# Patient Record
Sex: MALE | Race: OTHER | URBAN - METROPOLITAN AREA
[De-identification: names, ages, dates, MRNs, and addresses within clinical notes are randomized per-mention and may not be internally consistent; named-entity substitution may affect disease eponyms.]

---

## 2018-05-24 ENCOUNTER — HOSPITAL ENCOUNTER (EMERGENCY)
Facility: HOSPITAL | Age: 53
Discharge: HOME | End: 2018-05-25
Attending: EMERGENCY MEDICINE | Admitting: EMERGENCY MEDICINE

## 2018-05-24 VITALS
OXYGEN SATURATION: 96 % | WEIGHT: 216 LBS | RESPIRATION RATE: 14 BRPM | HEART RATE: 98 BPM | DIASTOLIC BLOOD PRESSURE: 120 MMHG | BODY MASS INDEX: 29.26 KG/M2 | HEIGHT: 72 IN | SYSTOLIC BLOOD PRESSURE: 224 MMHG | TEMPERATURE: 97.7 F

## 2018-05-24 DIAGNOSIS — S05.02XA ABRASION OF LEFT CORNEA, INITIAL ENCOUNTER: Primary | ICD-10-CM

## 2018-05-24 DIAGNOSIS — I10 HYPERTENSION, UNSPECIFIED TYPE: ICD-10-CM

## 2018-05-24 PROCEDURE — 93005 ELECTROCARDIOGRAM TRACING: CPT

## 2018-05-24 PROCEDURE — 93005 ELECTROCARDIOGRAM TRACING: CPT | Performed by: EMERGENCY MEDICINE

## 2018-05-24 RX ORDER — ERYTHROMYCIN 5 MG/G
OINTMENT OPHTHALMIC
Qty: 3.5 G | Refills: 0 | Status: SHIPPED | OUTPATIENT
Start: 2018-05-24 | End: 2020-08-01

## 2018-05-24 RX ORDER — CLONIDINE HYDROCHLORIDE 0.1 MG/1
0.1 TABLET ORAL ONCE
Status: DISCONTINUED | OUTPATIENT
Start: 2018-05-24 | End: 2018-05-25 | Stop reason: HOSPADM

## 2018-05-24 RX ORDER — ACETAMINOPHEN AND CODEINE PHOSPHATE 300; 30 MG/1; MG/1
1 TABLET ORAL EVERY 6 HOURS PRN
Qty: 12 TABLET | Refills: 0 | Status: SHIPPED | OUTPATIENT
Start: 2018-05-24 | End: 2020-08-01

## 2018-05-24 ASSESSMENT — ENCOUNTER SYMPTOMS
EYE REDNESS: 1
SHORTNESS OF BREATH: 0
PHOTOPHOBIA: 1
ABDOMINAL PAIN: 0
EYE PAIN: 1

## 2018-05-24 NOTE — Clinical Note
Luis, primary Rn, went into room to discharge patient and patient was not in room.  Unable to locate patient in the Ed.  Pt. Left without being medicated and without d/c paperwork.  I attempted to call patient 3x and received no answer.  Will attempt on ce more and leave voicemail

## 2018-05-25 LAB
ATRIAL RATE: 85
ATRIAL RATE: 87
P AXIS: 42
P AXIS: 44
PR INTERVAL: 146
PR INTERVAL: 164
QRS DURATION: 104
QRS DURATION: 98
QT INTERVAL: 360
QT INTERVAL: 376
QTC CALCULATION(BAZETT): 433
QTC CALCULATION(BAZETT): 447
R AXIS: -20
R AXIS: -22
T WAVE AXIS: 36
T WAVE AXIS: 46
VENTRICULAR RATE: 85
VENTRICULAR RATE: 87

## 2018-05-25 PROCEDURE — 99283 EMERGENCY DEPT VISIT LOW MDM: CPT | Mod: 25

## 2018-05-25 PROCEDURE — 99281 EMR DPT VST MAYX REQ PHY/QHP: CPT

## 2018-05-25 NOTE — ED PROVIDER NOTES
HPI     Chief Complaint   Patient presents with   • Eye Problem       Patient is a 52-year-old male who presents with left eye discomfort, patient reports that earlier in the day he felt something get into his eye, patient is unsure of exactly what it was, thinks that maybe it was sitting and or a piece of rock, he is continuing to have discomfort in the eye and presents for evaluation, no other trauma reported, no fevers reported             Patient History     Past Medical History:   Diagnosis Date   • Hypertension        History reviewed. No pertinent surgical history.    History reviewed. No pertinent family history.    Social History   Substance Use Topics   • Smoking status: Never Smoker   • Smokeless tobacco: Never Used   • Alcohol use No       Systems Reviewed from Nursing Triage:  Tobacco  Allergies  Meds  Problems  Med Hx  Surg Hx  Soc Hx         Review of Systems     Review of Systems   Eyes: Positive for photophobia, pain and redness.   Respiratory: Negative for shortness of breath.    Cardiovascular: Negative for chest pain.   Gastrointestinal: Negative for abdominal pain.        Physical Exam     ED Triage Vitals [05/24/18 2137]   Temp Heart Rate Resp BP SpO2   36.5 °C (97.7 °F) 98 14 (!) 224/120 96 %      Temp Source Heart Rate Source Patient Position BP Location FiO2 (%) (Set)   Temporal Monitor Sitting Right upper arm --                     Patient Vitals for the past 24 hrs:   BP Temp Temp src Pulse Resp SpO2 Height Weight   05/24/18 2137 (!) 224/120 36.5 °C (97.7 °F) Temporal 98 14 96 % 1.829 m (6') 98 kg (216 lb)           Physical Exam   Eyes: Left eye exhibits no discharge. No foreign body present in the left eye. Left conjunctiva is injected. Left conjunctiva has no hemorrhage.                Procedures    ED Course & [facility]     Labs Reviewed - No data to display    ECG 12 lead    (Results Pending)           [facility]           ED Course          Clinical Impressions as of May 24 2222   Abrasion  of left cornea, initial encounter     Disposition:       Duane K Godshall, MD  05/24/18 3819

## 2020-08-01 ENCOUNTER — HOSPITAL ENCOUNTER (INPATIENT)
Facility: HOSPITAL | Age: 55
LOS: 3 days | Discharge: HOME | DRG: 660 | End: 2020-08-04
Attending: EMERGENCY MEDICINE | Admitting: STUDENT IN AN ORGANIZED HEALTH CARE EDUCATION/TRAINING PROGRAM

## 2020-08-01 ENCOUNTER — APPOINTMENT (EMERGENCY)
Dept: RADIOLOGY | Facility: HOSPITAL | Age: 55
DRG: 660 | End: 2020-08-01
Attending: EMERGENCY MEDICINE

## 2020-08-01 ENCOUNTER — ANESTHESIA EVENT (INPATIENT)
Dept: OPERATING ROOM | Facility: HOSPITAL | Age: 55
DRG: 660 | End: 2020-08-01

## 2020-08-01 DIAGNOSIS — N20.0 NEPHROLITHIASIS: ICD-10-CM

## 2020-08-01 DIAGNOSIS — E11.9 NEWLY DIAGNOSED DIABETES (CMS/HCC): Primary | ICD-10-CM

## 2020-08-01 PROBLEM — N17.9 AKI (ACUTE KIDNEY INJURY) (CMS/HCC): Status: ACTIVE | Noted: 2020-08-01

## 2020-08-01 PROBLEM — I10 HTN (HYPERTENSION): Status: ACTIVE | Noted: 2020-08-01

## 2020-08-01 PROBLEM — N50.9 SCROTAL LESION: Status: ACTIVE | Noted: 2020-08-01

## 2020-08-01 PROBLEM — I25.10 CAD (CORONARY ARTERY DISEASE): Status: ACTIVE | Noted: 2020-08-01

## 2020-08-01 LAB
ALBUMIN SERPL-MCNC: 3.9 G/DL (ref 3.4–5)
ALP SERPL-CCNC: 125 IU/L (ref 35–126)
ALT SERPL-CCNC: 13 IU/L (ref 16–63)
ANION GAP SERPL CALC-SCNC: 15 MEQ/L (ref 3–15)
AST SERPL-CCNC: 12 IU/L (ref 15–41)
B-OH-BUTYR SERPL-SCNC: 1.42 MMOL/L
BACTERIA URNS QL MICRO: ABNORMAL /HPF
BASE EXCESS BLDV CALC-SCNC: -1.3 MEQ/L
BASOPHILS # BLD: 0.03 K/UL (ref 0.01–0.1)
BASOPHILS NFR BLD: 0.4 %
BILIRUB DIRECT SERPL-MCNC: 0.1 MG/DL
BILIRUB SERPL-MCNC: 0.6 MG/DL (ref 0.3–1.2)
BILIRUB UR QL STRIP.AUTO: NEGATIVE MG/DL
BUN SERPL-MCNC: 29 MG/DL (ref 8–20)
CALCIUM SERPL-MCNC: 8.8 MG/DL (ref 8.9–10.3)
CHLORIDE SERPL-SCNC: 94 MEQ/L (ref 98–109)
CLARITY UR REFRACT.AUTO: CLEAR
CO2 BLDV-SCNC: 25.6 MEQ/L (ref 22–32)
CO2 SERPL-SCNC: 21 MEQ/L (ref 22–32)
COLOR UR AUTO: YELLOW
CREAT SERPL-MCNC: 2 MG/DL (ref 0.8–1.3)
DIFFERENTIAL METHOD BLD: ABNORMAL
EOSINOPHIL # BLD: 0.09 K/UL (ref 0.04–0.54)
EOSINOPHIL NFR BLD: 1.2 %
ERYTHROCYTE [DISTWIDTH] IN BLOOD BY AUTOMATED COUNT: 11.6 % (ref 11.6–14.4)
FIO2 ON VENT: NORMAL %
GFR SERPL CREATININE-BSD FRML MDRD: 35.1 ML/MIN/1.73M*2
GLUCOSE BLD-MCNC: 216 MG/DL (ref 70–99)
GLUCOSE BLD-MCNC: 223 MG/DL (ref 70–99)
GLUCOSE BLD-MCNC: 302 MG/DL (ref 70–99)
GLUCOSE BLD-MCNC: 315 MG/DL (ref 70–99)
GLUCOSE BLD-MCNC: 337 MG/DL (ref 70–99)
GLUCOSE BLD-MCNC: 423 MG/DL (ref 70–99)
GLUCOSE BLOOD, POC: 423
GLUCOSE SERPL-MCNC: 385 MG/DL (ref 70–99)
GLUCOSE UR STRIP.AUTO-MCNC: >=1000 MG/DL
HCO3 BLDV-SCNC: 24.3 MEQ/L (ref 21–28)
HCT VFR BLDCO AUTO: 44.4 % (ref 40.1–51)
HGB BLD-MCNC: 15.8 G/DL (ref 13.7–17.5)
HGB UR QL STRIP.AUTO: 1
HYALINE CASTS #/AREA URNS LPF: ABNORMAL /LPF
IMM GRANULOCYTES # BLD AUTO: 0.02 K/UL (ref 0–0.08)
IMM GRANULOCYTES NFR BLD AUTO: 0.3 %
INHALED O2 CONCENTRATION: NORMAL %
KETONES UR STRIP.AUTO-MCNC: 1 MG/DL
LEUKOCYTE ESTERASE UR QL STRIP.AUTO: NEGATIVE
LIPASE SERPL-CCNC: 19 U/L (ref 20–51)
LYMPHOCYTES # BLD: 1.55 K/UL (ref 1.2–3.5)
LYMPHOCYTES NFR BLD: 20.6 %
MCH RBC QN AUTO: 29.3 PG (ref 28–33.2)
MCHC RBC AUTO-ENTMCNC: 35.6 G/DL (ref 32.2–36.5)
MCV RBC AUTO: 82.2 FL (ref 83–98)
MONOCYTES # BLD: 0.66 K/UL (ref 0.3–1)
MONOCYTES NFR BLD: 8.8 %
NEUTROPHILS # BLD: 5.18 K/UL (ref 1.7–7)
NEUTS SEG NFR BLD: 68.7 %
NITRITE UR QL STRIP.AUTO: NEGATIVE
NRBC BLD-RTO: 0 %
PCO2 BLDV: 43 MM HG (ref 41–51)
PDW BLD AUTO: 10.5 FL (ref 9.4–12.4)
PH BLDV: 7.36 [PH] (ref 7.32–7.42)
PH UR STRIP.AUTO: 6 [PH]
PLATELET # BLD AUTO: 186 K/UL (ref 150–350)
PO2 BLDV: 36 MM HG (ref 25–40)
POCT TEST: ABNORMAL
POTASSIUM SERPL-SCNC: 4.1 MEQ/L (ref 3.6–5.1)
PROT SERPL-MCNC: 7.8 G/DL (ref 6–8.2)
PROT UR QL STRIP.AUTO: 2
RBC # BLD AUTO: 5.4 M/UL (ref 4.5–5.8)
RBC #/AREA URNS HPF: ABNORMAL /HPF
SARS-COV-2 RNA RESP QL NAA+PROBE: NEGATIVE
SODIUM SERPL-SCNC: 130 MEQ/L (ref 136–144)
SP GR UR REFRACT.AUTO: 1.03
SQUAMOUS URNS QL MICRO: ABNORMAL /HPF
UROBILINOGEN UR STRIP-ACNC: 0.2 EU/DL
WBC # BLD AUTO: 7.53 K/UL (ref 3.8–10.5)
WBC #/AREA URNS HPF: ABNORMAL /HPF

## 2020-08-01 PROCEDURE — 25800000 HC PHARMACY IV SOLUTIONS: Performed by: EMERGENCY MEDICINE

## 2020-08-01 PROCEDURE — 63600000 HC DRUGS/DETAIL CODE: Performed by: PHYSICIAN ASSISTANT

## 2020-08-01 PROCEDURE — 82010 KETONE BODYS QUAN: CPT | Performed by: EMERGENCY MEDICINE

## 2020-08-01 PROCEDURE — U0002 COVID-19 LAB TEST NON-CDC: HCPCS | Performed by: PHYSICIAN ASSISTANT

## 2020-08-01 PROCEDURE — 99285 EMERGENCY DEPT VISIT HI MDM: CPT | Mod: 25

## 2020-08-01 PROCEDURE — 96374 THER/PROPH/DIAG INJ IV PUSH: CPT

## 2020-08-01 PROCEDURE — 83690 ASSAY OF LIPASE: CPT | Performed by: PHYSICIAN ASSISTANT

## 2020-08-01 PROCEDURE — 80053 COMPREHEN METABOLIC PANEL: CPT | Performed by: EMERGENCY MEDICINE

## 2020-08-01 PROCEDURE — 81001 URINALYSIS AUTO W/SCOPE: CPT | Performed by: EMERGENCY MEDICINE

## 2020-08-01 PROCEDURE — 74176 CT ABD & PELVIS W/O CONTRAST: CPT

## 2020-08-01 PROCEDURE — 96372 THER/PROPH/DIAG INJ SC/IM: CPT | Mod: 59

## 2020-08-01 PROCEDURE — 36415 COLL VENOUS BLD VENIPUNCTURE: CPT | Performed by: EMERGENCY MEDICINE

## 2020-08-01 PROCEDURE — 82248 BILIRUBIN DIRECT: CPT | Performed by: PHYSICIAN ASSISTANT

## 2020-08-01 PROCEDURE — 63600000 HC DRUGS/DETAIL CODE: Performed by: STUDENT IN AN ORGANIZED HEALTH CARE EDUCATION/TRAINING PROGRAM

## 2020-08-01 PROCEDURE — 25800000 HC PHARMACY IV SOLUTIONS: Performed by: STUDENT IN AN ORGANIZED HEALTH CARE EDUCATION/TRAINING PROGRAM

## 2020-08-01 PROCEDURE — 12000000 HC ROOM AND CARE MED/SURG

## 2020-08-01 PROCEDURE — 1123F ACP DISCUSS/DSCN MKR DOCD: CPT | Performed by: STUDENT IN AN ORGANIZED HEALTH CARE EDUCATION/TRAINING PROGRAM

## 2020-08-01 PROCEDURE — 85025 COMPLETE CBC W/AUTO DIFF WBC: CPT | Performed by: EMERGENCY MEDICINE

## 2020-08-01 PROCEDURE — 63600000 HC DRUGS/DETAIL CODE: Performed by: EMERGENCY MEDICINE

## 2020-08-01 PROCEDURE — 82803 BLOOD GASES ANY COMBINATION: CPT | Performed by: EMERGENCY MEDICINE

## 2020-08-01 PROCEDURE — 99222 1ST HOSP IP/OBS MODERATE 55: CPT | Performed by: STUDENT IN AN ORGANIZED HEALTH CARE EDUCATION/TRAINING PROGRAM

## 2020-08-01 PROCEDURE — 83036 HEMOGLOBIN GLYCOSYLATED A1C: CPT | Performed by: STUDENT IN AN ORGANIZED HEALTH CARE EDUCATION/TRAINING PROGRAM

## 2020-08-01 PROCEDURE — 93005 ELECTROCARDIOGRAM TRACING: CPT | Performed by: EMERGENCY MEDICINE

## 2020-08-01 RX ORDER — DEXTROSE 50 % IN WATER (D50W) INTRAVENOUS SYRINGE
25 AS NEEDED
Status: DISCONTINUED | OUTPATIENT
Start: 2020-08-01 | End: 2020-08-04 | Stop reason: HOSPADM

## 2020-08-01 RX ORDER — HYDROMORPHONE HYDROCHLORIDE 1 MG/ML
0.4 INJECTION, SOLUTION INTRAMUSCULAR; INTRAVENOUS; SUBCUTANEOUS EVERY 4 HOURS PRN
Status: DISCONTINUED | OUTPATIENT
Start: 2020-08-01 | End: 2020-08-04 | Stop reason: HOSPADM

## 2020-08-01 RX ORDER — INSULIN ASPART 100 [IU]/ML
8 INJECTION, SOLUTION INTRAVENOUS; SUBCUTANEOUS ONCE
Status: COMPLETED | OUTPATIENT
Start: 2020-08-01 | End: 2020-08-01

## 2020-08-01 RX ORDER — IBUPROFEN 200 MG
16-32 TABLET ORAL AS NEEDED
Status: DISCONTINUED | OUTPATIENT
Start: 2020-08-01 | End: 2020-08-04 | Stop reason: HOSPADM

## 2020-08-01 RX ORDER — KETOROLAC TROMETHAMINE 15 MG/ML
15 INJECTION, SOLUTION INTRAMUSCULAR; INTRAVENOUS EVERY 6 HOURS PRN
Status: ACTIVE | OUTPATIENT
Start: 2020-08-01 | End: 2020-08-02

## 2020-08-01 RX ORDER — HEPARIN SODIUM 5000 [USP'U]/ML
5000 INJECTION, SOLUTION INTRAVENOUS; SUBCUTANEOUS EVERY 8 HOURS
Status: DISCONTINUED | OUTPATIENT
Start: 2020-08-01 | End: 2020-08-04 | Stop reason: HOSPADM

## 2020-08-01 RX ORDER — INSULIN ASPART 100 [IU]/ML
12 INJECTION, SOLUTION INTRAVENOUS; SUBCUTANEOUS ONCE
Status: COMPLETED | OUTPATIENT
Start: 2020-08-02 | End: 2020-08-01

## 2020-08-01 RX ORDER — INSULIN ASPART 100 [IU]/ML
6-10 INJECTION, SOLUTION INTRAVENOUS; SUBCUTANEOUS
Status: DISCONTINUED | OUTPATIENT
Start: 2020-08-01 | End: 2020-08-01

## 2020-08-01 RX ORDER — SODIUM CHLORIDE 9 MG/ML
INJECTION, SOLUTION INTRAVENOUS CONTINUOUS
Status: ACTIVE | OUTPATIENT
Start: 2020-08-01 | End: 2020-08-02

## 2020-08-01 RX ORDER — INSULIN ASPART 100 [IU]/ML
6-10 INJECTION, SOLUTION INTRAVENOUS; SUBCUTANEOUS
Status: DISCONTINUED | OUTPATIENT
Start: 2020-08-02 | End: 2020-08-01

## 2020-08-01 RX ORDER — HYDROMORPHONE HYDROCHLORIDE 1 MG/ML
0.5 INJECTION, SOLUTION INTRAMUSCULAR; INTRAVENOUS; SUBCUTANEOUS ONCE
Status: COMPLETED | OUTPATIENT
Start: 2020-08-01 | End: 2020-08-01

## 2020-08-01 RX ORDER — INSULIN ASPART 100 [IU]/ML
6-10 INJECTION, SOLUTION INTRAVENOUS; SUBCUTANEOUS
Status: DISCONTINUED | OUTPATIENT
Start: 2020-08-02 | End: 2020-08-02

## 2020-08-01 RX ORDER — DEXTROSE 40 %
15-30 GEL (GRAM) ORAL AS NEEDED
Status: DISCONTINUED | OUTPATIENT
Start: 2020-08-01 | End: 2020-08-04 | Stop reason: HOSPADM

## 2020-08-01 RX ORDER — ONDANSETRON HYDROCHLORIDE 2 MG/ML
4 INJECTION, SOLUTION INTRAVENOUS ONCE
Status: COMPLETED | OUTPATIENT
Start: 2020-08-01 | End: 2020-08-01

## 2020-08-01 RX ADMIN — HYDROMORPHONE HYDROCHLORIDE 0.4 MG: 1 INJECTION, SOLUTION INTRAMUSCULAR; INTRAVENOUS; SUBCUTANEOUS at 21:07

## 2020-08-01 RX ADMIN — HEPARIN SODIUM 5000 UNITS: 5000 INJECTION, SOLUTION INTRAVENOUS; SUBCUTANEOUS at 21:08

## 2020-08-01 RX ADMIN — INSULIN ASPART 12 UNITS: 100 INJECTION, SOLUTION INTRAVENOUS; SUBCUTANEOUS at 23:38

## 2020-08-01 RX ADMIN — INSULIN ASPART 6 UNITS: 100 INJECTION, SOLUTION INTRAVENOUS; SUBCUTANEOUS at 18:30

## 2020-08-01 RX ADMIN — ONDANSETRON 4 MG: 2 INJECTION INTRAMUSCULAR; INTRAVENOUS at 12:34

## 2020-08-01 RX ADMIN — INSULIN ASPART 8 UNITS: 100 INJECTION, SOLUTION INTRAVENOUS; SUBCUTANEOUS at 15:17

## 2020-08-01 RX ADMIN — SODIUM CHLORIDE 1000 ML: 9 INJECTION, SOLUTION INTRAVENOUS at 12:33

## 2020-08-01 RX ADMIN — SODIUM CHLORIDE 1000 ML: 9 INJECTION, SOLUTION INTRAVENOUS at 15:08

## 2020-08-01 RX ADMIN — SODIUM CHLORIDE: 9 INJECTION, SOLUTION INTRAVENOUS at 18:21

## 2020-08-01 RX ADMIN — HYDROMORPHONE HYDROCHLORIDE 0.5 MG: 1 INJECTION, SOLUTION INTRAMUSCULAR; INTRAVENOUS; SUBCUTANEOUS at 12:34

## 2020-08-01 ASSESSMENT — ENCOUNTER SYMPTOMS
FREQUENCY: 0
CONSTIPATION: 1
COUGH: 0
SHORTNESS OF BREATH: 0
HEADACHES: 0
DIZZINESS: 0
FLANK PAIN: 1
DYSURIA: 0
NAUSEA: 1
ABDOMINAL PAIN: 0
CHILLS: 0
LIGHT-HEADEDNESS: 0
VOMITING: 0
DIFFICULTY URINATING: 0
APPETITE CHANGE: 1
HEMATURIA: 0
FEVER: 0

## 2020-08-01 ASSESSMENT — COGNITIVE AND FUNCTIONAL STATUS - GENERAL
DRESSING REGULAR UPPER BODY CLOTHING: 4 - NONE
HELP NEEDED FOR BATHING: 4 - NONE
STANDING UP FROM CHAIR USING ARMS: 4 - NONE
CLIMB 3 TO 5 STEPS WITH RAILING: 4 - NONE
MOVING TO AND FROM BED TO CHAIR: 4 - NONE
DRESSING REGULAR LOWER BODY CLOTHING: 4 - NONE
TOILETING: 4 - NONE
WALKING IN HOSPITAL ROOM: 4 - NONE
HELP NEEDED FOR PERSONAL GROOMING: 4 - NONE
EATING MEALS: 4 - NONE

## 2020-08-01 NOTE — CONSULTS
"Subjective Dilermano Anjos is a 54 y.o. male who was admitted for Newly diagnosed diabetes (CMS/[facility]) [E11.9] and right sided ureteral calculi causing intractable pain. He has a PMH of CAD s/p coronary stent. Patient states  over the last 3-4 days he's had worsening pain right flank pain. He denies fever, chills, nausea, vomiting our gross hematuria. He says he's had increasing nocturia which began 4-5 months ago.       Medical History:   Past Medical History:   Diagnosis Date   • H/O heart artery stent    • Hypertension        Surgical History: History reviewed. No pertinent surgical history.    Social History:   Social History     Social History Narrative   • Not on file       Family History: History reviewed. No pertinent family history.    Allergies: Patient has no known allergies.    Current Facility-Administered Medications   Medication Dose Route Frequency Provider Last Rate Last Dose   • sodium chloride 0.9 % bolus 1,000 mL  1,000 mL intravenous Once Hugh Israel, DO 1,000 mL/hr at 08/01/20 1508 1,000 mL at 08/01/20 1508     No current outpatient medications on file.       Review of Systems  Pertinent items are noted in HPI.    Objective   Labs  I have reviewed the patient's labs.  Significant abnormals are Cr 2.0.    Imaging  Significant findings include:   CT Abdomen:    --  IMPRESSION:  1.  Moderate right hydroureteronephrosis on the basis of an obstructing 0.8 cm  calculus in the right ureterovesicular junction.  Right renal mass not  completely characterized on this exam.     2.  A 1.2 cm calcification in the scrotum, nonspecific, likely benign.        Physicial Exam  Visit Vitals  BP (!) 181/90   Pulse 97   Temp 37.1 °C (98.8 °F) (Temporal)   Resp 16   Ht 1.778 m (5' 10\")   Wt 90.7 kg (200 lb)   SpO2 96%   BMI 28.70 kg/m²     General appearance: alert, appears stated age and cooperative  Head: normocephalic, without obvious abnormality, atraumatic  Back: Minimal tenderness in right flank to " percussion  Lungs: clear to auscultation bilaterally  Heart: regular rate and rhythm  Abdomen: Soft tender to deep palpation in RLQ, no rebound tenderness, not distended  Male genitalia: Normal male genitalia, not circumcised, but testes descended, there is a soft nodule at the base of his penis that does not appear inflammed. He states he's had this since childhood  Extremities: extremities normal, warm and well-perfused; no cyanosis, clubbing, or edema  Neurologic: Grossly normal      Assessment   54 y.o. male being consulted for management recommendations  fpr ureteral calculi and calcified mass at base of penis     Plan   1. Admitted to medicine for new onset diabetes  2. Recommend NPO after midnight for likely OR in AM for cystoscopy and possible ureteral stent  3. Recommend scrotal ultrasound to evaluate calcification  4. D/W Michael Arce MD pager [phone]

## 2020-08-01 NOTE — ASSESSMENT & PLAN NOTE
CT A/P demonstrating 8mm obstructing R UVJ stone with moderate hydroureteronephrosis  S/p Laser lithotripsy 8/3  Urology recs appreciated: Stent placed 8/3, follow up 8/6 to stent removal

## 2020-08-01 NOTE — ED ATTESTATION NOTE
I have personally seen and examined the patient.  I reviewed and agree with physician assistant / nurse practitioner’s assessment and plan of care.     Exam: Patient is resting comfortably in no acute distress.  Slightly tachycardic but the remainder of his vital signs are stable and he is afebrile.  Heart is regular without murmur.  Lungs are clear to auscultation bilaterally.  Abdominal exam reveals mild tenderness in the right lower quadrant and right inguinal area.  There is also positive right-sided CVA tenderness to percussion.    Plan: Patient presents with marked hyperglycemia which is new for him.  He does not carry a diagnosis of diabetes at this time.  Additionally will evaluate right-sided flank and abdominal tenderness.  Will check labs including VBG and BHB.  Will check urine and a CT scan to further evaluate the right flank pain.           Hugh Israel,   08/01/20 [id number]

## 2020-08-01 NOTE — ASSESSMENT & PLAN NOTE
Cr 2 on admission  Secondary to volume depletion and hydronephrosis  BUN/Cr 1.1/18 (26/1.7)  GFR >60  S/p IVF  Monitor BMP

## 2020-08-01 NOTE — ASSESSMENT & PLAN NOTE
Patient denies a hx of diabetes,Glu 423 on admission despite poor PO intake  A1c 14.7  SSI, Hypoglycemia protocol  Endocrinology recs appreciated: Humalin 20U AM, 12U QHS. Novolog 12U BID with meals  Follow up as outpatient in 1-2 weeks

## 2020-08-01 NOTE — H&P
Hospital Medicine Service -  History & Physical        CHIEF COMPLAINT   Abdominal Pain     HISTORY OF PRESENT ILLNESS      Dilermano Anjos is a 54 y.o. male with a past medical history of CAD s/p stents x2 in 2015 and HTN who presents from urgent care for evaluation of hyperglycemia and abdominal pain. Patient states right sided abdominal pain began 5 days ago and has been worsening in nature. He admits to associated nausea and vomiting that started 1 days prior to presentation. Today he presented to urgent care, and was told to come to the emergency room for high blood sugar and further work up. Patient denies a history of hyperglycemia, diabetes, or renal disease. He reports being evaluated in a Hospital in [place] 5 months ago for abdominal pain. He states an ultrasound of his kidneys were normal, and he denies having a CT done at that time.    In the ED, initial glucose was 423 and patient reported poor PO intake over the last few days with his nausea. CT of the A/P demonstrated an 8mm, obstructing R UVJ calculus with moderate hydroureteronephrosis.    Patient is Full Code    PAST MEDICAL AND SURGICAL HISTORY      Past Medical History:   Diagnosis Date   • Coronary artery disease    • H/O heart artery stent    • Hypertension        History reviewed. No pertinent surgical history.    PCP: Pt States, No Pcp    MEDICATIONS      Prior to Admission medications    Medication Sig Start Date End Date Taking? Authorizing Provider   acetaminophen-codeine (TYLENOL #3) 300-30 mg per tablet Take 1 tablet by mouth every 6 (six) hours as needed for moderate pain. NO driving or operating heavy machinery when taking. 5/24/18 8/1/20 Yes Godshall, Duane K, MD   erythromycin (ILOTYCIN) 5 mg/gram (0.5 %) ophthalmic ointment Apply to affected eye(s) 6 (six) times a day. 0.5inch to affected every q4hr while awake x 1 week 5/24/18 8/1/20 Yes Godshall, Duane K, MD       ALLERGIES      Patient has no known allergies.    FAMILY HISTORY       Family History   Problem Relation Age of Onset   • Diabetes Mother's Sister        SOCIAL HISTORY      Social History     Socioeconomic History   • Marital status: Single     Spouse name: None   • Number of children: None   • Years of education: None   • Highest education level: None   Occupational History   • None   Social Needs   • Financial resource strain: None   • Food insecurity:     Worry: None     Inability: None   • Transportation needs:     Medical: None     Non-medical: None   Tobacco Use   • Smoking status: Never Smoker   • Smokeless tobacco: Never Used   Substance and Sexual Activity   • Alcohol use: No   • Drug use: No   • Sexual activity: None   Lifestyle   • Physical activity:     Days per week: None     Minutes per session: None   • Stress: None   Relationships   • Social connections:     Talks on phone: None     Gets together: None     Attends [employer] service: None     Active member of club or organization: None     Attends meetings of clubs or organizations: None     Relationship status: None   • Intimate partner violence:     Fear of current or ex partner: None     Emotionally abused: None     Physically abused: None     Forced sexual activity: None   Other Topics Concern   • None   Social History Narrative   • None       REVIEW OF SYSTEMS      All other systems reviewed and negative except as noted in HPI    PHYSICAL EXAMINATION      Temp:  [36.5 °C (97.7 °F)-37.1 °C (98.8 °F)] 36.5 °C (97.7 °F)  Heart Rate:  [] 75  Resp:  [15-20] 16  BP: (143-181)/(87-97) 172/87  Body mass index is 31.32 kg/m².    GENERAL APPEARANCE Well developed, well nourished, AAOx3, NAD   MUCUS MEMBRANES Moist   LUNGS CTAB, no wheezes/rales/rhonchi appreciated, no signs of respiratory distress   CHEST S1/S2, RRR, no murmurs/rubs/gallops appreciated   ABDOMEN  GENITOURINARY Soft, + Right sided TTP, ND, +BS  No Locke, no suprapubic TTP, + R CVA TTP   EXTREMITIES PETERSON x4   SKIN No obvious rash   HEENT  NEURO  Pupils equal, Anicteric  Follows commands, no dysarthria or facial asymmetry          LABS / IMAGING / EKG        Labs  Results from last 7 days   Lab Units 08/01/20  1213   SODIUM mEQ/L 130*   POTASSIUM mEQ/L 4.1   CHLORIDE mEQ/L 94*   CO2 mEQ/L 21*   BUN mg/dL 29*   CREATININE mg/dL 2.0*   GLUCOSE mg/dL 385*   CALCIUM mg/dL 8.8*     Results from last 7 days   Lab Units 08/01/20  1213   WBC K/uL 7.53   HEMOGLOBIN g/dL 15.8   HEMATOCRIT % 44.4   PLATELETS K/uL 186     Beta Hydroxybuterate 1.42  UA: Glu >1000, +1 Ketone, WBC 0-3, Neg Leuk esterase/Nitrite        Imaging  Ct Abdomen Pelvis Without Iv Contrast    Result Date: 8/1/2020  CLINICAL HISTORY:Right-sided flank pain.  Acute kidney injury. COMPARISON: None available COMMENT: TECHNIQUE: CT of the abdomen and pelvis was performed with the patient in the supine position. Images reconstructed/reformatted in the axial, coronal and sagittal planes. CT DOSE:  One or more dose reduction techniques (e.g. automated exposure control, adjustment of the mA and/or kV according to patient size, use of iterative reconstruction technique) utilized for this examination. ORAL CONTRAST: None INTRAVENOUS CONTRAST: No intravenous contrast given. Evaluation of the certain processes, solid organs and patency of vasculature is limited without IV contrast. LOWER CHEST: Linear atelectasis at the dependent posterior medial lung bases bilaterally.  Heart is normal in size without pericardial effusion.  Coronary artery calcification. LIVER: Within normal limits. BILE DUCTS: Normal caliber. GALLBLADDER: No calcified gallstones. Normal caliber wall. PANCREAS: Within normal limits. SPLEEN: Within normal limits.  Small splenule is noted anterior to the spleen. ADRENALS: Within normal limits. KIDNEYS/URETERS/BLADDER: Moderate right hydroureteronephrosis on the basis of an obstructing 0.8 cm calculus in the right ureterovesicular junction.  The maximum Hounsfield unit measures 582.  Additional  0.2 cm calculus in the right interpolar region.  Mild bilateral perinephric stranding, nonspecific.  No left renal hydronephrosis or calculi.  Left ureter is unremarkable. Bladder is unremarkable.  There is an exophytic mass possibly a cyst in the lower pole of the right kidney measuring about 3 cm.  This cannot be completely characterized on this noncontrast exam.  Comparison to previous studies/contrast enhanced study/ultrasound is suggested. REPRODUCTIVE ORGANS: A 1.2 cm calcification in the scrotum, nonspecific. Otherwise no pelvic masses. BOWEL: Normal caliber bowel.  Appendix is normal. Normal bowel caliber. PERITONEUM: No ascites or free air, no fluid collection VESSELS: Atherosclerotic calcification of the aorta and branching arteries. LYMPH NODES: within normal limits. ABDOMINAL WALL: Small fat-containing umbilical hernia. BONES: Within normal limits.     IMPRESSION: 1.  Moderate right hydroureteronephrosis on the basis of an obstructing 0.8 cm calculus in the right ureterovesicular junction.  Right renal mass not completely characterized on this exam. 2.  A 1.2 cm calcification in the scrotum, nonspecific, likely benign. Findings discussed with emergency department physician assistant, Camille Mathias, on 8/1/2020 at 2:50 PM. I certify that I have personally reviewed this study and agree with this report. Kole Carey MD        ECG/Telemetry  EKG personally reviewed. NSR 87, normal intervals. Inferior Q waves consistent with previous inferior infarct    ASSESSMENT AND PLAN           * Newly diagnosed diabetes (CMS/[facility])  Assessment & Plan  Patient denies a hx of diabetes  Glu 423 on admission and reports being unable to eat today  Given 8U Novolog in the ED  A1c ordered  SSI  Hypoglycemia protocol  Endocrinology consult and recs appreciated    Nephrolithiasis  Assessment & Plan  CT A/P demonstrating 8mm obstructing R UVJ stone with moderate hydroureteronephrosis  Pain control with Toradol and Dilaudid  PRN  NSS at 100 cc/hr  Urology consult and recs appreciated: NPO after MN for stent placement tomorrow.    PRINCESS (acute kidney injury) (CMS/HCC)  Assessment & Plan  Denies history of renal disease  Pre-renal from volume depletion and/or intrarenal from DM or hydronephrosis  BUN/Cr 29/2  GFR 35  S/p 1L IVF in the ED, continue with NSS 100cc/hr  Monitor BMP    Scrotal lesion  Assessment & Plan  Incidental Scrotal 1.2cm calcification   Denies scrotal pain  Urology recommending scrotal US    CAD (coronary artery disease)  Assessment & Plan  Hx CAD s/p RCA stents x2 in 5/2015  Denies cardiac complaints  EKG reviewed  Reports not on cardiac meds as outpt, should follow as outpt       VTE Assessment: Padua VTE Score: 0  VTE Prophylaxis Plan: Heparin  Code Status: Full Code  Estimated Discharge Date: 8/3/2020  [facility]   8/1/2020

## 2020-08-01 NOTE — ED PROVIDER NOTES
HPI     Chief Complaint   Patient presents with   • Hyperglycemia       Patient is a 54-year-old male with past medical history of hypertension, CAD s/p stent X1 presenting with chief complaint of right flank pain.  Patient reports for the last 3 to 4 days he has experienced gradually worsening pain in his right flank.  He reports decreased appetite, admits to nausea but denies vomiting.  He denies any history of trauma or fall.  Patient went to an urgent care and had blood work drawn which revealed an elevated creatinine at 2.1.  Patient denies prior history of renal disease.  Patient was also noted to have a blood sugar a little over 400 and denies any prior history of hyperglycemia or diagnosis of diabetes.  Patient reports no change in urination.  He denies prior history of UTI/pyelonephritis or nephrolithiasis.  He reports about 3 to 4 months ago he had blood work drawn which was unremarkable and normal renal function.      History provided by:  Patient       Patient History     Past Medical History:   Diagnosis Date   • H/O heart artery stent    • Hypertension        History reviewed. No pertinent surgical history.    History reviewed. No pertinent family history.    Social History     Tobacco Use   • Smoking status: Never Smoker   • Smokeless tobacco: Never Used   Substance Use Topics   • Alcohol use: No   • Drug use: No       Systems Reviewed from Nursing Triage:  Allergies  Meds          Review of Systems     Review of Systems   Constitutional: Positive for appetite change. Negative for chills and fever.   Respiratory: Negative for cough and shortness of breath.    Gastrointestinal: Positive for constipation (-) BM in 5 days and nausea. Negative for abdominal pain and vomiting.   Genitourinary: Positive for flank pain (R). Negative for difficulty urinating, dysuria, frequency, hematuria and urgency.   Neurological: Negative for dizziness, light-headedness and headaches.        Physical Exam     ED Triage  "Vitals   Temp Heart Rate Resp BP SpO2   08/01/20 1145 08/01/20 1145 08/01/20 1145 08/01/20 1145 08/01/20 1145   37.1 °C (98.8 °F) (!) 103 20 (!) 143/97 97 %      Temp Source Heart Rate Source Patient Position BP Location FiO2 (%) (Set)   08/01/20 1145 08/01/20 1426 -- -- --   Temporal Monitor          Pulse Ox %: 97 % (08/01/20 1153)  Pulse Ox Interpretation: Normal (08/01/20 1153)  Heart Rate: 103 (08/01/20 1153)  Rhythm Strip Interpretation: Sinus Tachycardia (08/01/20 1153)    Patient Vitals for the past 24 hrs:   BP Temp Temp src Pulse Resp SpO2 Height Weight   08/01/20 1426 (!) 181/90 -- -- 97 16 96 % -- --   08/01/20 1145 (!) 143/97 37.1 °C (98.8 °F) Temporal (!) 103 20 97 % -- --   08/01/20 1144 -- -- -- -- -- -- 1.778 m (5' 10\") 90.7 kg (200 lb)                                          Physical Exam   Constitutional: He is oriented to person, place, and time. He appears well-developed and well-nourished.  Non-toxic appearance. No distress.   HENT:   Head: Normocephalic and atraumatic.   Eyes: Pupils are equal, round, and reactive to light. Conjunctivae and EOM are normal.   Neck: Normal range of motion.   Cardiovascular: Normal rate and regular rhythm.   Pulmonary/Chest: Effort normal and breath sounds normal. No stridor. No respiratory distress. He has no wheezes.   Abdominal: Soft. He exhibits no distension. There is tenderness (right lateral/ right lower). There is CVA tenderness (right). There is no rigidity, no rebound and no guarding.   Neurological: He is alert and oriented to person, place, and time.   Skin: Skin is warm and dry.            Procedures    Labs Reviewed   CBC AND DIFF - Abnormal       Result Value    WBC 7.53      RBC 5.40      Hemoglobin 15.8      Hematocrit 44.4      MCV 82.2 (*)     MCH 29.3      MCHC 35.6      RDW 11.6      Platelets 186      MPV 10.5      Differential Type Auto      nRBC 0.0      Immature Granulocytes 0.3      Neutrophils 68.7      Lymphocytes 20.6      Monocytes " 8.8      Eosinophils 1.2      Basophils 0.4      Immature Granulocytes, Absolute 0.02      Neutrophils, Absolute 5.18      Lymphocytes, Absolute 1.55      Monocytes, Absolute 0.66      Eosinophils, Absolute 0.09      Basophils, Absolute 0.03     BASIC METABOLIC PANEL - Abnormal    Sodium 130 (*)     Potassium 4.1      Chloride 94 (*)     CO2 21 (*)     BUN 29 (*)     Creatinine 2.0 (*)     Glucose 385 (*)     Calcium 8.8 (*)     eGFR 35.1 (*)     Anion Gap 15     B-HYDROXYBUTYRATE - Abnormal    Beta-Hydroxybutyrate 1.42 (*)    UA REFLEX CULTURE (MACROSCOPIC) - Abnormal    Color, Urine Yellow      Clarity, Urine Clear      Specific Gravity, Urine 1.030      pH, Urine 6.0      Leukocyte Esterase Negative      Nitrite, Urine Negative      Protein, Urine +2 (*)     Glucose, Urine >=1000 (*)     Ketones, Urine +1 (*)     Urobilinogen, Urine 0.2      Bilirubin, Urine Negative      Blood, Urine +1 (*)    HEPATIC FUNCTION PANEL - Abnormal    Albumin 3.9      Bilirubin, Total 0.6      Bilirubin, Direct 0.1      Alkaline Phosphatase 125      AST (SGOT) 12 (*)     ALT (SGPT) 13 (*)     Total Protein 7.8     LIPASE - Abnormal    Lipase 19 (*)    UA MICROSCOPIC - Abnormal    RBC, Urine 5 TO 9 (*)     WBC, Urine 0 TO 3      Squamous Epithelial None Seen      Hyaline Cast 0 TO 2 (*)     Bacteria, Urine None Seen     POCT GLUCOSE - Abnormal    Glucose Blood,      POCT GLUCOSE (BEAKER) - Abnormal    POCT Bedside Glucose 423 (*)     POC Test POC     POCT GLUCOSE (BEAKER) - Abnormal    POCT Bedside Glucose 315 (*)     POC Test POC     POCT GLUCOSE (BEAKER) - Abnormal    POCT Bedside Glucose 302 (*)     POC Test POC     SARS-COV-2 (COVID 19), PCR   URINALYSIS REFLEX CULTURE (ED AND OUTPATIENT ONLY)    Narrative:     The following orders were created for panel order UA with reflex culture.  Procedure                               Abnormality         Status                     ---------                               -----------          ------                     UA Reflex to Culture (Mac...[[id number]]  Abnormal            Final result               UA Microscopic[[id number]]               Abnormal            Final result                 Please view results for these tests on the individual orders.   BLOOD GAS, VENOUS    pH, Venous 7.36      pCO2, Venous 43      pO2, Venous 36      HCO3, Venous 24.3      Base Excess, Venous -1.3      Source Of Oxygen room air      TCO2, Venous 25.6      FIO2 21%     RAINBOW DRAW PANEL    Narrative:     The following orders were created for panel order [place] Draw Panel.  Procedure                               Abnormality         Status                     ---------                               -----------         ------                     RAINBOW RED[[id number]]                                       In process                 RAINBOW LT BLUE[[id number]]                                   In process                 RAINBOW LT GREEN[[id number]]                                  In process                 RAINBOW GOLD[[id number]]                                      In process                   Please view results for these tests on the individual orders.   HEMOGLOBIN A1C   RAINBOW RED   RAINBOW LT BLUE   RAINBOW LT GREEN   RAINBOW GOLD       CT ABDOMEN PELVIS WITHOUT IV CONTRAST   Final Result   IMPRESSION:   1.  Moderate right hydroureteronephrosis on the basis of an obstructing 0.8 cm   calculus in the right ureterovesicular junction.  Right renal mass not   completely characterized on this exam.      2.  A 1.2 cm calcification in the scrotum, nonspecific, likely benign.      Findings discussed with emergency department physician assistant, Camille Mathias, on 8/1/2020 at 2:50 PM.      I certify that I have personally reviewed this study and agree with this report.   Kole Carey MD      ECG 12 lead    (Results Pending)   ULTRASOUND SCROTUM    (Results Pending)   ULTRASOUND DOPPLER    (Results Pending)                ED Course & MDM     MDM         ED Course as of Aug 01 1555   Sat Aug 01, 2020   1310 Will order CT without contrast to eval kidney due to flank pain. Would like to avoid contrast due to PRINCESS as Creat is 2.1 with GFR of 35 & pt has not prior history of renal disease.     [KM]   1344 Although the BHB is elevated the VBG is unremarkable and anion gap is normal will continue IV fluids and insulin bolus via subcu route without insulin infusion    [LEXI]   1354 Will repeat Accu-Chek after IV fluid bolus if sugar still remains elevated will treat with subcu insulin    [LEXI]   1425 Will give SQ insulin   POCT Bedside Glucose(!): 315 [KM]   1458 CT scan reveals distal right ureteral stone as well as incidentally a calcification in the scrotum of unknown etiology.  Will consult urology to evaluate.    [LEXI]   1502 Will be admitted to medicine for new onset diabetes as well as worsening renal failure.  Continue IV fluids and insulin    [LEXI]   1524 D/w Dr. Rodriguez who will consult on case & would like pt to stay in NPO in the event that he may need a ureter stent secondary to pain & size of stone.  Pt updated & understands to stay NPO.    [KM]      ED Course User Index  [LEXI] Hugh Israel DO  [KM] Camille Mathias PA C         Clinical Impressions as of Aug 01 1555   Newly diagnosed diabetes (CMS/[facility])   Nephrolithiasis     Dispo  Admit     Camille Mathias PA C  08/01/20 9103

## 2020-08-01 NOTE — ASSESSMENT & PLAN NOTE
Incidental Scrotal 1.2cm calcification   Denies scrotal pain  US unremarkable  Urology following

## 2020-08-01 NOTE — ASSESSMENT & PLAN NOTE
Hx CAD s/p RCA stents x2 in 5/2015  Denies cardiac complaints, EKG reviewed on admission  Reports taking Brilinta for 1 year, and states his doctor told him to stop. Denies taking aspirin, but patient would benefit from ASA for secondary prevention.  Cont ASA 81 mg, started this admission  Encouraged establishing care with a PCP.

## 2020-08-02 ENCOUNTER — ANESTHESIA (INPATIENT)
Dept: OPERATING ROOM | Facility: HOSPITAL | Age: 55
DRG: 660 | End: 2020-08-02

## 2020-08-02 PROBLEM — E87.1 HYPONATREMIA: Status: ACTIVE | Noted: 2020-08-02

## 2020-08-02 LAB
ANION GAP SERPL CALC-SCNC: 10 MEQ/L (ref 3–15)
ATRIAL RATE: 87
BUN SERPL-MCNC: 26 MG/DL (ref 8–20)
CALCIUM SERPL-MCNC: 8 MG/DL (ref 8.9–10.3)
CHLORIDE SERPL-SCNC: 99 MEQ/L (ref 98–109)
CO2 SERPL-SCNC: 22 MEQ/L (ref 22–32)
CREAT SERPL-MCNC: 1.7 MG/DL (ref 0.8–1.3)
ERYTHROCYTE [DISTWIDTH] IN BLOOD BY AUTOMATED COUNT: 11.7 % (ref 11.6–14.4)
EST. AVERAGE GLUCOSE BLD GHB EST-MCNC: 375 MG/DL
GFR SERPL CREATININE-BSD FRML MDRD: 42.4 ML/MIN/1.73M*2
GLUCOSE BLD-MCNC: 272 MG/DL (ref 70–99)
GLUCOSE BLD-MCNC: 278 MG/DL (ref 70–99)
GLUCOSE BLD-MCNC: 396 MG/DL (ref 70–99)
GLUCOSE BLD-MCNC: 451 MG/DL (ref 70–99)
GLUCOSE SERPL-MCNC: 269 MG/DL (ref 70–99)
HBA1C MFR BLD HPLC: 14.7 %
HCT VFR BLDCO AUTO: 39.2 % (ref 40.1–51)
HGB BLD-MCNC: 13.7 G/DL (ref 13.7–17.5)
MCH RBC QN AUTO: 29.3 PG (ref 28–33.2)
MCHC RBC AUTO-ENTMCNC: 34.9 G/DL (ref 32.2–36.5)
MCV RBC AUTO: 83.8 FL (ref 83–98)
P AXIS: 40
PDW BLD AUTO: 10.4 FL (ref 9.4–12.4)
PLATELET # BLD AUTO: 176 K/UL (ref 150–350)
POCT TEST: ABNORMAL
POTASSIUM SERPL-SCNC: 4.2 MEQ/L (ref 3.6–5.1)
PR INTERVAL: 150
QRS DURATION: 104
QT INTERVAL: 376
QTC CALCULATION(BAZETT): 452
R AXIS: -15
RBC # BLD AUTO: 4.68 M/UL (ref 4.5–5.8)
SODIUM SERPL-SCNC: 131 MEQ/L (ref 136–144)
T WAVE AXIS: 10
VENTRICULAR RATE: 87
WBC # BLD AUTO: 6.5 K/UL (ref 3.8–10.5)

## 2020-08-02 PROCEDURE — 63600000 HC DRUGS/DETAIL CODE: Performed by: HOSPITALIST

## 2020-08-02 PROCEDURE — 63600000 HC DRUGS/DETAIL CODE: Performed by: INTERNAL MEDICINE

## 2020-08-02 PROCEDURE — 25000000 HC PHARMACY GENERAL: Performed by: NURSE ANESTHETIST, CERTIFIED REGISTERED

## 2020-08-02 PROCEDURE — 63600105 HC IODINE BASED CONTRAST: Mod: JW | Performed by: UROLOGY

## 2020-08-02 PROCEDURE — 71000001 HC PACU PHASE 1 INITIAL 30MIN: Performed by: UROLOGY

## 2020-08-02 PROCEDURE — C1758 CATHETER, URETERAL: HCPCS | Performed by: UROLOGY

## 2020-08-02 PROCEDURE — 0TC78ZZ EXTIRPATION OF MATTER FROM LEFT URETER, VIA NATURAL OR ARTIFICIAL OPENING ENDOSCOPIC: ICD-10-PCS | Performed by: UROLOGY

## 2020-08-02 PROCEDURE — 36000003 HC OR LEVEL 3 INITIAL 30MIN: Performed by: UROLOGY

## 2020-08-02 PROCEDURE — 27200000 HC STERILE SUPPLY: Performed by: UROLOGY

## 2020-08-02 PROCEDURE — 63600000 HC DRUGS/DETAIL CODE: Performed by: NURSE ANESTHETIST, CERTIFIED REGISTERED

## 2020-08-02 PROCEDURE — 82365 CALCULUS SPECTROSCOPY: CPT | Performed by: UROLOGY

## 2020-08-02 PROCEDURE — 85027 COMPLETE CBC AUTOMATED: CPT | Performed by: STUDENT IN AN ORGANIZED HEALTH CARE EDUCATION/TRAINING PROGRAM

## 2020-08-02 PROCEDURE — 12000000 HC ROOM AND CARE MED/SURG

## 2020-08-02 PROCEDURE — 36000013 HC OR LEVEL 3 EA ADDL MIN: Performed by: UROLOGY

## 2020-08-02 PROCEDURE — C1769 GUIDE WIRE: HCPCS | Performed by: UROLOGY

## 2020-08-02 PROCEDURE — 25800000 HC PHARMACY IV SOLUTIONS: Performed by: HOSPITALIST

## 2020-08-02 PROCEDURE — 99223 1ST HOSP IP/OBS HIGH 75: CPT | Performed by: STUDENT IN AN ORGANIZED HEALTH CARE EDUCATION/TRAINING PROGRAM

## 2020-08-02 PROCEDURE — C2617 STENT, NON-COR, TEM W/O DEL: HCPCS | Performed by: UROLOGY

## 2020-08-02 PROCEDURE — 0T778DZ DILATION OF LEFT URETER WITH INTRALUMINAL DEVICE, VIA NATURAL OR ARTIFICIAL OPENING ENDOSCOPIC: ICD-10-PCS | Performed by: UROLOGY

## 2020-08-02 PROCEDURE — 87086 URINE CULTURE/COLONY COUNT: CPT | Performed by: UROLOGY

## 2020-08-02 PROCEDURE — 80048 BASIC METABOLIC PNL TOTAL CA: CPT | Performed by: STUDENT IN AN ORGANIZED HEALTH CARE EDUCATION/TRAINING PROGRAM

## 2020-08-02 PROCEDURE — 63600000 HC DRUGS/DETAIL CODE: Performed by: STUDENT IN AN ORGANIZED HEALTH CARE EDUCATION/TRAINING PROGRAM

## 2020-08-02 PROCEDURE — 37000001 HC ANESTHESIA GENERAL: Performed by: UROLOGY

## 2020-08-02 PROCEDURE — 36415 COLL VENOUS BLD VENIPUNCTURE: CPT | Performed by: STUDENT IN AN ORGANIZED HEALTH CARE EDUCATION/TRAINING PROGRAM

## 2020-08-02 PROCEDURE — BT1DYZZ FLUOROSCOPY OF RIGHT KIDNEY, URETER AND BLADDER USING OTHER CONTRAST: ICD-10-PCS | Performed by: UROLOGY

## 2020-08-02 PROCEDURE — 25800000 HC PHARMACY IV SOLUTIONS: Performed by: STUDENT IN AN ORGANIZED HEALTH CARE EDUCATION/TRAINING PROGRAM

## 2020-08-02 PROCEDURE — 71000011 HC PACU PHASE 1 EA ADDL MIN: Performed by: UROLOGY

## 2020-08-02 PROCEDURE — 63600000 HC DRUGS/DETAIL CODE: Performed by: UROLOGY

## 2020-08-02 DEVICE — STENT URETERAL 6FR 26CM: Type: IMPLANTABLE DEVICE | Site: URETER | Status: FUNCTIONAL

## 2020-08-02 RX ORDER — HYDROMORPHONE HYDROCHLORIDE 1 MG/ML
INJECTION, SOLUTION INTRAMUSCULAR; INTRAVENOUS; SUBCUTANEOUS AS NEEDED
Status: DISCONTINUED | OUTPATIENT
Start: 2020-08-02 | End: 2020-08-02 | Stop reason: SURG

## 2020-08-02 RX ORDER — HYDROMORPHONE HYDROCHLORIDE 1 MG/ML
0.5 INJECTION, SOLUTION INTRAMUSCULAR; INTRAVENOUS; SUBCUTANEOUS
Status: DISCONTINUED | OUTPATIENT
Start: 2020-08-02 | End: 2020-08-04 | Stop reason: HOSPADM

## 2020-08-02 RX ORDER — SODIUM CHLORIDE 9 MG/ML
INJECTION, SOLUTION INTRAVENOUS CONTINUOUS
Status: ACTIVE | OUTPATIENT
Start: 2020-08-02 | End: 2020-08-02

## 2020-08-02 RX ORDER — FENTANYL CITRATE 50 UG/ML
INJECTION, SOLUTION INTRAMUSCULAR; INTRAVENOUS AS NEEDED
Status: DISCONTINUED | OUTPATIENT
Start: 2020-08-02 | End: 2020-08-02 | Stop reason: SURG

## 2020-08-02 RX ORDER — CEFUROXIME AXETIL 250 MG/1
250 TABLET ORAL 2 TIMES DAILY
Qty: 10 TABLET | Refills: 0 | Status: SHIPPED | OUTPATIENT
Start: 2020-08-02 | End: 2020-08-04 | Stop reason: SDUPTHER

## 2020-08-02 RX ORDER — LABETALOL HCL 20 MG/4 ML
5 SYRINGE (ML) INTRAVENOUS AS NEEDED
Status: DISCONTINUED | OUTPATIENT
Start: 2020-08-02 | End: 2020-08-04 | Stop reason: HOSPADM

## 2020-08-02 RX ORDER — DEXTROSE 50 % IN WATER (D50W) INTRAVENOUS SYRINGE
25 AS NEEDED
Status: DISCONTINUED | OUTPATIENT
Start: 2020-08-02 | End: 2020-08-04 | Stop reason: HOSPADM

## 2020-08-02 RX ORDER — MIDAZOLAM HYDROCHLORIDE 2 MG/2ML
INJECTION, SOLUTION INTRAMUSCULAR; INTRAVENOUS AS NEEDED
Status: DISCONTINUED | OUTPATIENT
Start: 2020-08-02 | End: 2020-08-02 | Stop reason: SURG

## 2020-08-02 RX ORDER — DEXAMETHASONE SODIUM PHOSPHATE 4 MG/ML
INJECTION, SOLUTION INTRA-ARTICULAR; INTRALESIONAL; INTRAMUSCULAR; INTRAVENOUS; SOFT TISSUE AS NEEDED
Status: DISCONTINUED | OUTPATIENT
Start: 2020-08-02 | End: 2020-08-02 | Stop reason: SURG

## 2020-08-02 RX ORDER — ATROPINE SULFATE 0.4 MG/ML
0.2 INJECTION, SOLUTION ENDOTRACHEAL; INTRAMEDULLARY; INTRAMUSCULAR; INTRAVENOUS; SUBCUTANEOUS EVERY 5 MIN PRN
Status: DISCONTINUED | OUTPATIENT
Start: 2020-08-02 | End: 2020-08-04 | Stop reason: HOSPADM

## 2020-08-02 RX ORDER — INSULIN ASPART 100 [IU]/ML
10 INJECTION, SOLUTION INTRAVENOUS; SUBCUTANEOUS ONCE
Status: COMPLETED | OUTPATIENT
Start: 2020-08-02 | End: 2020-08-02

## 2020-08-02 RX ORDER — MIDAZOLAM HYDROCHLORIDE 2 MG/2ML
1 INJECTION, SOLUTION INTRAMUSCULAR; INTRAVENOUS AS NEEDED
Status: DISCONTINUED | OUTPATIENT
Start: 2020-08-02 | End: 2020-08-04 | Stop reason: HOSPADM

## 2020-08-02 RX ORDER — SODIUM CHLORIDE, SODIUM GLUCONATE, SODIUM ACETATE, POTASSIUM CHLORIDE AND MAGNESIUM CHLORIDE 30; 37; 368; 526; 502 MG/100ML; MG/100ML; MG/100ML; MG/100ML; MG/100ML
125 INJECTION, SOLUTION INTRAVENOUS CONTINUOUS
Status: DISCONTINUED | OUTPATIENT
Start: 2020-08-02 | End: 2020-08-04 | Stop reason: HOSPADM

## 2020-08-02 RX ORDER — INSULIN ASPART 100 [IU]/ML
2-8 INJECTION, SOLUTION INTRAVENOUS; SUBCUTANEOUS
Status: DISCONTINUED | OUTPATIENT
Start: 2020-08-02 | End: 2020-08-04 | Stop reason: HOSPADM

## 2020-08-02 RX ORDER — ASPIRIN 81 MG/1
81 TABLET ORAL DAILY
Status: DISCONTINUED | OUTPATIENT
Start: 2020-08-02 | End: 2020-08-04 | Stop reason: HOSPADM

## 2020-08-02 RX ORDER — TAMSULOSIN HYDROCHLORIDE 0.4 MG/1
0.4 CAPSULE ORAL DAILY
Qty: 5 CAPSULE | Refills: 0 | Status: SHIPPED | OUTPATIENT
Start: 2020-08-02 | End: 2020-08-07

## 2020-08-02 RX ORDER — PROPOFOL 10 MG/ML
INJECTION, EMULSION INTRAVENOUS AS NEEDED
Status: DISCONTINUED | OUTPATIENT
Start: 2020-08-02 | End: 2020-08-02 | Stop reason: SURG

## 2020-08-02 RX ORDER — EPHEDRINE SULFATE/0.9% NACL/PF 50 MG/5 ML
10 SYRINGE (ML) INTRAVENOUS AS NEEDED
Status: DISCONTINUED | OUTPATIENT
Start: 2020-08-02 | End: 2020-08-04 | Stop reason: HOSPADM

## 2020-08-02 RX ORDER — LISINOPRIL 5 MG/1
5 TABLET ORAL DAILY
Status: DISCONTINUED | OUTPATIENT
Start: 2020-08-02 | End: 2020-08-04

## 2020-08-02 RX ORDER — INSULIN ASPART 100 [IU]/ML
12 INJECTION, SOLUTION INTRAVENOUS; SUBCUTANEOUS ONCE
Status: COMPLETED | OUTPATIENT
Start: 2020-08-03 | End: 2020-08-02

## 2020-08-02 RX ORDER — ROCURONIUM BROMIDE 10 MG/ML
INJECTION, SOLUTION INTRAVENOUS AS NEEDED
Status: DISCONTINUED | OUTPATIENT
Start: 2020-08-02 | End: 2020-08-02 | Stop reason: SURG

## 2020-08-02 RX ORDER — IBUPROFEN 200 MG
16-32 TABLET ORAL AS NEEDED
Status: DISCONTINUED | OUTPATIENT
Start: 2020-08-02 | End: 2020-08-04 | Stop reason: HOSPADM

## 2020-08-02 RX ORDER — LIDOCAINE HYDROCHLORIDE 10 MG/ML
INJECTION, SOLUTION INFILTRATION; PERINEURAL AS NEEDED
Status: DISCONTINUED | OUTPATIENT
Start: 2020-08-02 | End: 2020-08-02 | Stop reason: SURG

## 2020-08-02 RX ORDER — DEXTROSE 40 %
15-30 GEL (GRAM) ORAL AS NEEDED
Status: DISCONTINUED | OUTPATIENT
Start: 2020-08-02 | End: 2020-08-04 | Stop reason: HOSPADM

## 2020-08-02 RX ORDER — HYDRALAZINE HYDROCHLORIDE 20 MG/ML
5 INJECTION INTRAMUSCULAR; INTRAVENOUS
Status: DISCONTINUED | OUTPATIENT
Start: 2020-08-02 | End: 2020-08-04 | Stop reason: HOSPADM

## 2020-08-02 RX ORDER — ONDANSETRON HYDROCHLORIDE 2 MG/ML
4 INJECTION, SOLUTION INTRAVENOUS
Status: DISCONTINUED | OUTPATIENT
Start: 2020-08-02 | End: 2020-08-04 | Stop reason: HOSPADM

## 2020-08-02 RX ORDER — INSULIN ASPART 100 [IU]/ML
6 INJECTION, SOLUTION INTRAVENOUS; SUBCUTANEOUS
Status: DISCONTINUED | OUTPATIENT
Start: 2020-08-02 | End: 2020-08-03

## 2020-08-02 RX ORDER — PHENYLEPHRINE HYDROCHLORIDE 10 MG/ML
INJECTION INTRAVENOUS AS NEEDED
Status: DISCONTINUED | OUTPATIENT
Start: 2020-08-02 | End: 2020-08-02 | Stop reason: SURG

## 2020-08-02 RX ORDER — ONDANSETRON HYDROCHLORIDE 2 MG/ML
INJECTION, SOLUTION INTRAVENOUS AS NEEDED
Status: DISCONTINUED | OUTPATIENT
Start: 2020-08-02 | End: 2020-08-02 | Stop reason: SURG

## 2020-08-02 RX ORDER — FENTANYL CITRATE 50 UG/ML
50 INJECTION, SOLUTION INTRAMUSCULAR; INTRAVENOUS
Status: DISCONTINUED | OUTPATIENT
Start: 2020-08-02 | End: 2020-08-04 | Stop reason: HOSPADM

## 2020-08-02 RX ADMIN — HYDROMORPHONE HYDROCHLORIDE 0.4 MG: 1 INJECTION, SOLUTION INTRAMUSCULAR; INTRAVENOUS; SUBCUTANEOUS at 06:43

## 2020-08-02 RX ADMIN — ONDANSETRON 4 MG: 2 INJECTION INTRAMUSCULAR; INTRAVENOUS at 10:02

## 2020-08-02 RX ADMIN — PROPOFOL 30 MG: 10 INJECTION, EMULSION INTRAVENOUS at 10:58

## 2020-08-02 RX ADMIN — HEPARIN SODIUM 5000 UNITS: 5000 INJECTION, SOLUTION INTRAVENOUS; SUBCUTANEOUS at 21:26

## 2020-08-02 RX ADMIN — DEXAMETHASONE SODIUM PHOSPHATE 4 MG: 4 INJECTION, SOLUTION INTRA-ARTICULAR; INTRALESIONAL; INTRAMUSCULAR; INTRAVENOUS; SOFT TISSUE at 10:02

## 2020-08-02 RX ADMIN — MIDAZOLAM HYDROCHLORIDE 2 MG: 1 INJECTION, SOLUTION INTRAMUSCULAR; INTRAVENOUS at 09:53

## 2020-08-02 RX ADMIN — ROCURONIUM BROMIDE 30 MG: 10 INJECTION, SOLUTION INTRAVENOUS at 10:24

## 2020-08-02 RX ADMIN — INSULIN ASPART 10 UNITS: 100 INJECTION, SOLUTION INTRAVENOUS; SUBCUTANEOUS at 18:10

## 2020-08-02 RX ADMIN — HYDRALAZINE HYDROCHLORIDE 10 MG: 20 INJECTION INTRAMUSCULAR; INTRAVENOUS at 00:23

## 2020-08-02 RX ADMIN — PROPOFOL 200 MG: 10 INJECTION, EMULSION INTRAVENOUS at 09:55

## 2020-08-02 RX ADMIN — ROCURONIUM BROMIDE 20 MG: 10 INJECTION, SOLUTION INTRAVENOUS at 10:10

## 2020-08-02 RX ADMIN — HEPARIN SODIUM 5000 UNITS: 5000 INJECTION, SOLUTION INTRAVENOUS; SUBCUTANEOUS at 06:42

## 2020-08-02 RX ADMIN — SODIUM CHLORIDE: 900 INJECTION, SOLUTION INTRAVENOUS at 09:40

## 2020-08-02 RX ADMIN — PHENYLEPHRINE HYDROCHLORIDE 100 MCG: 10 INJECTION INTRAVENOUS at 10:22

## 2020-08-02 RX ADMIN — PHENYLEPHRINE HYDROCHLORIDE 200 MCG: 10 INJECTION INTRAVENOUS at 10:28

## 2020-08-02 RX ADMIN — SUCCINYLCHOLINE CHLORIDE 120 MG: 20 INJECTION, SOLUTION INTRAMUSCULAR; INTRAVENOUS; PARENTERAL at 09:55

## 2020-08-02 RX ADMIN — SUGAMMADEX 200 MG: 100 INJECTION, SOLUTION INTRAVENOUS at 11:05

## 2020-08-02 RX ADMIN — LIDOCAINE HYDROCHLORIDE 5 ML: 10 INJECTION, SOLUTION INFILTRATION; PERINEURAL at 09:55

## 2020-08-02 RX ADMIN — INSULIN ASPART 6 UNITS: 100 INJECTION, SOLUTION INTRAVENOUS; SUBCUTANEOUS at 09:08

## 2020-08-02 RX ADMIN — INSULIN ASPART 12 UNITS: 100 INJECTION, SOLUTION INTRAVENOUS; SUBCUTANEOUS at 23:11

## 2020-08-02 RX ADMIN — INSULIN HUMAN 8 UNITS: 100 INJECTION, SUSPENSION SUBCUTANEOUS at 20:08

## 2020-08-02 RX ADMIN — INSULIN ASPART 6 UNITS: 100 INJECTION, SOLUTION INTRAVENOUS; SUBCUTANEOUS at 17:17

## 2020-08-02 RX ADMIN — FENTANYL CITRATE 100 MCG: 50 INJECTION, SOLUTION INTRAMUSCULAR; INTRAVENOUS at 09:55

## 2020-08-02 RX ADMIN — CEFTRIAXONE SODIUM 1 G: 1 INJECTION, POWDER, FOR SOLUTION INTRAVENOUS at 10:15

## 2020-08-02 RX ADMIN — HYDROMORPHONE HYDROCHLORIDE 0.5 MG: 1 INJECTION, SOLUTION INTRAMUSCULAR; INTRAVENOUS; SUBCUTANEOUS at 11:04

## 2020-08-02 RX ADMIN — PHENYLEPHRINE HYDROCHLORIDE 200 MCG: 10 INJECTION INTRAVENOUS at 10:55

## 2020-08-02 RX ADMIN — PHENYLEPHRINE HYDROCHLORIDE 200 MCG: 10 INJECTION INTRAVENOUS at 10:46

## 2020-08-02 RX ADMIN — INSULIN ASPART 6 UNITS: 100 INJECTION, SOLUTION INTRAVENOUS; SUBCUTANEOUS at 11:57

## 2020-08-02 NOTE — CONSULTS
Endocrinology Consult Note    SUBJECTIVE    Dilermano Anjos is a 54 y.o. male who was admitted for Nephrolithiasis [N20.0]  Newly diagnosed diabetes (CMS/[facility]) [E11.9].  Patient presented to urgent care with complaints of nausea, vomiting and flank pain.  He was found to have very elevated glucose there and was referred to ED for further evaluation.  Pt is from [place]- [place] and works here but goes home to [place] for a few months annually.  He says that while there over the winter he had episode of flank pain and had ultrasound there which did not show anything.  Says he was told 5 years ago at the time of stent placement ( had abnormal stress test- asymptomatic) that he was prediabetic.  He initially did well with diet and exercise (plays soccer).  He says he got  tow years ago and since then he has not been taking care of himself.  He notes weight loss of about 60 pounds over the course of the past few months.  In ED glucose was 423.  Had ureteral stent placement and laser lithotripsy earlier today.  Pt days he feels much better today.  Pain and nausea resolved.  Still with hematuria.  Glucoses 278, 272 so far today.  One aunt with Type 1 diabetes who had pancreatic transplant- no other relatives with diabetes.  Works construction.  Has no medical insurance.      Medical History:   Past Medical History:   Diagnosis Date   • Coronary artery disease    • H/O heart artery stent    • Hypertension        Surgical History: History reviewed. No pertinent surgical history.    Social History:   Social History     Tobacco Use   • Smoking status: Never Smoker   • Smokeless tobacco: Never Used   Substance Use Topics   • Alcohol use: No       Family History:   Family History   Problem Relation Age of Onset   • Diabetes Mother's Sister        Allergies: Patient has no known allergies.    Current Facility-Administered Medications   Medication Dose Route Frequency Provider Last Rate Last Dose   • aspirin  enteric coated tablet 81 mg  81 mg oral Daily Pamela Bacon, DO       • atropine injection 0.2 mg  0.2 mg intravenous q5 min PRN Harman Lara MD       • glucose chewable tablet 16-32 g of dextrose  16-32 g of dextrose oral PRN Pamela Bacon DO        Or   • dextrose 40 % oral gel 15-30 g of dextrose  15-30 g of dextrose oral PRN Pamela Bacon, DO        Or   • glucagon (GLUCAGEN) injection 1 mg  1 mg intramuscular PRN Pamela Baocn DO        Or   • dextrose in water injection 12.5 g  25 mL intravenous PRN Pamela Bacon, DO       • glucose chewable tablet 16-32 g of dextrose  16-32 g of dextrose oral PRN Harman Lara MD        Or   • dextrose 40 % oral gel 15-30 g of dextrose  15-30 g of dextrose oral PRN Harman Lara MD        Or   • glucagon (GLUCAGEN) injection 1 mg  1 mg intramuscular PRN Harman Lara MD        Or   • dextrose in water injection 12.5 g  25 mL intravenous PRN Harman Lara MD       • electrolyte-A (PLASMA-LYTE A) infusion  125 mL/hr intravenous Continuous Harman Lara MD       • ePHEDrine sulfate-0.9%NaCl(PF) 50 mg/5 mL (10 mg/mL) injection 10 mg  10 mg intravenous PRN Harman Lara MD       • fentaNYL (PF) (SUBLIMAZE) injection 50 mcg  50 mcg intravenous q15 min PRN Harman Lara MD       • heparin (porcine) 5,000 unit/mL injection 5,000 Units  5,000 Units subcutaneous q8h [facility] Pamela Bacon DO   5,000 Units at 08/02/20 0642   • hydrALAZINE (APRESOLINE) 10 mg in sodium chloride 0.9 % 50 mL IVPB  10 mg intravenous q6h PRN Pamela Bacon DO   Stopped at 08/02/20 0053   • hydrALAZINE (APRESOLINE) injection 5 mg  5 mg intravenous q15 min PRN Harman Lara MD       • HYDROmorphone (DILAUDID) 1 mg/mL injection 0.4 mg  0.4 mg intravenous q4h PRN Pamela Bacon DO   0.4 mg at 08/02/20 0643   • HYDROmorphone (DILAUDID) injection 0.5 mg  0.5 mg intravenous q15 min Harman Bey MD       • insulin aspart U-100 (NovoLOG)  "pen 2-8 Units  2-8 Units subcutaneous With meals & nightly Sandie Kevin MD       • insulin aspart U-100 (NovoLOG) pen 6 Units  6 Units subcutaneous BID AC Sandie Kevin MD       • [START ON 8/3/2020] insulin NPH U-100 human (HumuLIN N,NovoLIN N) pen 16 Units  16 Units subcutaneous q AM Sandie Kevin MD       • insulin NPH U-100 human (HumuLIN N,NovoLIN N) pen 8 Units  8 Units subcutaneous q PM Sandie Kevin MD       • ketorolac (TORADOL) injection 15 mg  15 mg intravenous q6h PRN Pamela Bacon DO       • labetaloL (NORMODYNE,TRANDATE) injection 5 mg  5 mg intravenous PRN Harman Lara MD       • lisinopriL (PRINIVIL) tablet 5 mg  5 mg oral Daily Pamela Bacon DO       • midazolam (VERSED) 1 mg/mL injection 1 mg  1 mg intravenous PRN Harman Lara MD       • ondansetron (ZOFRAN) injection 4 mg  4 mg intravenous q15 min PRN Harman Lara MD       • sodium chloride 0.9 % infusion   intravenous Continuous Pamela Bacon DO           Review of Systems  Review of systems is as above otherwise negative    Visit Vitals  BP (!) 166/91 (BP Location: Left upper arm, Patient Position: Lying)   Pulse 72   Temp 36.7 °C (98 °F) (Temporal)   Resp 18   Ht 1.727 m (5' 8\")   Wt 93.4 kg (206 lb)   SpO2 97%   BMI 31.32 kg/m²       Physical Exam  Physical Exam   Constitutional: He is oriented to person, place, and time. He appears well-developed and well-nourished. No distress.   HENT:   Head: Normocephalic and atraumatic.   Mouth/Throat: No oropharyngeal exudate.   Eyes: Pupils are equal, round, and reactive to light. EOM are normal. No scleral icterus.   Neck: Normal range of motion. Neck supple. No thyromegaly present.   Cardiovascular: Normal rate, regular rhythm and normal heart sounds.   No murmur heard.  Pulmonary/Chest: Effort normal and breath sounds normal. No respiratory distress.   Abdominal: Soft. Bowel sounds are normal. He exhibits no distension. There is no tenderness.   Musculoskeletal: " Normal range of motion. He exhibits no edema or deformity.   Lymphadenopathy:     He has no cervical adenopathy.   Neurological: He is alert and oriented to person, place, and time.   Skin: Skin is warm and dry.   Psychiatric: He has a normal mood and affect.   Nursing note and vitals reviewed.      OBJECTIVE    Labs  Lab Results   Component Value Date    GLUCOSE 269 (H) 08/02/2020    CALCIUM 8.0 (L) 08/02/2020     (L) 08/02/2020    K 4.2 08/02/2020    CO2 22 08/02/2020    CL 99 08/02/2020    BUN 26 (H) 08/02/2020    CREATININE 1.7 (H) 08/02/2020     Lab Results   Component Value Date    ALT 13 (L) 08/01/2020    AST 12 (L) 08/01/2020    ALKPHOS 125 08/01/2020    BILITOT 0.6 08/01/2020     Lab Results   Component Value Date    HGBA1C 14.7 (H) 08/01/2020     No results found for: TSH    PRINCESS (acute kidney injury) (CMS/[facility])  Assessment & Plan  Renal function improving    HTN (hypertension)  Assessment & Plan  Agree with addition of ACE-I    Nephrolithiasis  Assessment & Plan  S/p laser lithotripsy and ureteral stent    * Newly diagnosed diabetes (CMS/[facility])  Assessment & Plan  He has had significant hyperglcycemia prior to admission- a1c 14.7%.  Given hx of aunt with type 1 diabetes and no other family members with type 2 and sigificant weight loss will send DANIELLA 65 pancreatic autoantibody- rule out JAMSHID.  He will need to go home on insulin in the short term even if diabetes proves to be type 2.  Given his lack of medical insurance his least expensive option will be NPH and regular insulin- these can be bought without a prescrption at [facility] for $25/vial.  He will need to be taught to draw insulin with a syringe.  Will use NPH here so we can work out his dosing for discharge.  He will need a glucose meter- cheapest option will be [employer] meter and strips.  He will need follow up in 1-2 weeks after discharge.      Sandie Kevin MD  8/2/2020 2:59 PM

## 2020-08-02 NOTE — PLAN OF CARE
Problem: Adult Inpatient Plan of Care  Goal: Plan of Care Review  Outcome: Progressing  Flowsheets (Taken 8/2/2020 0500)  Progress: improving  Plan of Care Reviewed With: patient  Outcome Summary: Afebrile. Hydralazine for elevation BP. Dilaudid for right flank pain. Plan for OR today. IVF.

## 2020-08-02 NOTE — NURSING NOTE
Dr. Chaparro returned the paged , made MD aware of the patient  Blood sugar results, MD  says she will put order in for 10 units of insulin.

## 2020-08-02 NOTE — OP NOTE
Dilermano Anjos  1965  [id number]    Date of Service: 8/2/2020    Pre-operative Diagnosis: Right ureteral and kidney stone  Post-operative Diagnosis: same  Procedure: Cystoscopy, right Retrograde Pyelogram with radiologic interpretation, right Semi-rigid Ureteroscopy and flexible ureteroscopy with laser lithotripsy, Insertion of right JJ stent (6x26)  Attending Surgeon: Moisés Rodriguez MD  Assistants: Pamela Bacon DO  Anesthesia: General  EBL: Minimal  Complications: None  Specimens: Right urine culture, right ureteral stone    Findings:  1. Large distal right ureteral stone with mild to moderate edema. Small right kidney stone  2. Radiologic interpretation revealed no strictures present, no filling defects, and no extravasation in the right collecting system.    Indications for procedure: Dilermano Anjos is a 54 y.o. with a history of flank pain and distal right ureteral stone. After discussing the risk, benefits, and alternatives, the patient elected to move forward with  Ureteroscopy.    Procedure in detail:  After informed written consent was obtained the patient was brought into the operating  room and general anesthesia was established. The patient was placed in the relaxed  dorsal lithotomy position with all pressure points padded and the genitalia were prepped  and draped in the usual sterile fashion. Cystoscopic evaluation was then performed with  a 22Fr rigid cystoscope. The urethra was of normal course and caliber. The bladder outlet was mildly occlusive. Left ureteral orifice was noted to be of normal position and morphology, right was edematous with stone present at UO. The bladder capacity was adequate. The bladder mucosa was thoroughly inspected using a 30 degree lens. There were no bladder masses or bladder tumors.     Once the bladder was adequately inspected our attention was turned to the right ureteral orifice. A 0.038 guidewire was placed into the ureteral orifice to the renal  pelvis using fluoroscopic guidance. The scope was then removed.  A semi-rigid ureteroscope was then inserted and to ensure safe passage a 2nd wire was inserted through the scope, allowing for easy entry into the distal ureter.    A stone was then visualized in the distal right ureter.  Laser lithotripsy was then performed breaking the stones into smaller fragments. A basket was then used to remove the stone fragments. Once there were no longer any stones present in the ureter a pull out ureteroscopy was then performed revealing no evidence of any stone fragments and a wire was placed under fluoroscopic guidance into the renal pelvis.  At which point a flexible ureteroscope was inserted under fluoroscopic guidance into the renal pelvis to evaluate for any additional stone fragments. Pyeloscopy was performed revealing hydronephrosis and small kidney stone. A culture was obtained. A basket was inserted and the right kidney stone was removed. There were no other stone fragments.    A gentle retrograde pyelogram was performed to highlight the collecting system to assist with stent placement. Radiologic interpretation revealed no strictures present, no filling defects, and no extravasation in the right collecting system.  A right 6x26 JJ stent was placed under fluoroscopic guidance. The JJ stent was noted to be in good position both in the renal pelvis and bladder. At this point the bladder was drained and the cystoscope was withdrawn.    I as the attending surgeon was present and performed all key portions of the procedure.

## 2020-08-02 NOTE — PLAN OF CARE
Problem: Adult Inpatient Plan of Care  Goal: Plan of Care Review  Outcome: Progressing   S/P  Cysto  retro stent  urethoscopy , patient in room resting well, no c/o pain/discomfort, voiding dark pink urine;  Endo was on unit to see patient.  Blood sugar elevated insulin administered as ordered.      Problem: Adult Inpatient Plan of Care  Goal: Patient-Specific Goal (Individualization)  Outcome: Progressing   Patient  likes to have his phone charged periodically.

## 2020-08-02 NOTE — ANESTHESIA PREPROCEDURE EVALUATION
Relevant Problems   CARDIOVASCULAR   (+) CAD (coronary artery disease)   (+) HTN (hypertension)      URINARY SYSTEM   (+) Nephrolithiasis       Anesthesia ROS/MED HX      Cardiovascular   Cardiac stents   Covid19 Test Reviewed and ECG reviewed  Abnormal ECG      Renal Disease   electrolyte problem   renal calculi  Endo/Other   Diabetes and patient Insulin dependent  Body Habitus: Obese     CBC Results       08/01/20                          1213           WBC 7.53           RBC 5.40           HGB 15.8           HCT 44.4           MCV 82.2           MCH 29.3           MCHC 35.6                                    BMP Results       08/01/20                          1213                      K 4.1           Cl 94           CO2 21           Glucose 385           BUN 29           Creatinine 2.0           Calcium 8.8           Anion Gap 15           EGFR 35.1           Comment for K at 1213 on 08/01/20:    Results obtained on plasma. Plasma Potassium values may be up to 0.4 mEQ/L less than serum values. The differences may be greater for patients with high platelet or white cell counts.          History reviewed. No pertinent surgical history.    Physical Exam    Airway   Mallampati: III   TM distance: >3 FB   Neck ROM: full  Cardiovascular - normal   Rhythm: regular   Rate: normalPulmonary - normal   clear to auscultation  Dental - normal        Anesthesia Plan    Plan: general    Technique: general endotracheal     Airway: direct visual laryngoscopy and video laryngoscope     not instructed to abstain from smoking on day of procedure    Patient did not smoke on day of surgery  ASA 3  Blood Products:   Use of Blood Products Discussed: No   Anesthetic plan and risks discussed with: patient  Induction:    intravenous   Postop Plan:   Patient Disposition: inpatient floor planned admission   Pain Management: IV analgesics

## 2020-08-02 NOTE — PROGRESS NOTES
Hospital Medicine Service -  Daily Progress Note       SUBJECTIVE   Interval History:   Patient was seen and examined at bedside. No acute events overnight. He is scheduled for ureteral stent placement today with Urology. Denies fevers, chills, chest pain, and SOB.     OBJECTIVE      Vital signs in last 24 hours:  Temp:  [36.1 °C (97 °F)-37.1 °C (98.8 °F)] 36.7 °C (98 °F)  Heart Rate:  [] 80  Resp:  [15-20] 16  BP: (143-185)/(87-97) 185/97    Intake/Output Summary (Last 24 hours) at 8/2/2020 0936  Last data filed at 8/2/2020 0900  Gross per 24 hour   Intake --   Output 1450 ml   Net -1450 ml       PHYSICAL EXAMINATION      GENERAL APPEARANCE Well developed, well nourished, AAOx3, NAD   MUCUS MEMBRANES Moist   LUNGS CTAB, no wheezes/rales/rhonchi appreciated, no signs of respiratory distress   CHEST S1/S2, RRR, no murmurs/rubs/gallops appreciated   ABDOMEN  GENITOURINARY Soft, + Right sided TTP, ND, +BS  No Locke, no suprapubic TTP, + R CVA TTP   EXTREMITIES PETERSON x4   SKIN No obvious rash   HEENT  NEURO Pupils equal, Anicteric  Follows commands, no dysarthria or facial asymmetry        LINES, CATHETERS, DRAINS, AIRWAYS, AND WOUNDS   Lines, Drains, Airways, Wounds:  Peripheral IV 08/01/20 Right Antecubital (Active)   Number of days: 1       Comments:      LABS / IMAGING / TELE      Labs  Results from last 7 days   Lab Units 08/02/20  0517 08/01/20  1213   SODIUM mEQ/L 131* 130*   POTASSIUM mEQ/L 4.2 4.1   CHLORIDE mEQ/L 99 94*   CO2 mEQ/L 22 21*   BUN mg/dL 26* 29*   CREATININE mg/dL 1.7* 2.0*   GLUCOSE mg/dL 269* 385*   CALCIUM mg/dL 8.0* 8.8*     Results from last 7 days   Lab Units 08/02/20  0517 08/01/20  1213   WBC K/uL 6.50 7.53   HEMOGLOBIN g/dL 13.7 15.8   HEMATOCRIT % 39.2* 44.4   PLATELETS K/uL 176 186         Imaging  Ct Abdomen Pelvis Without Iv Contrast    Result Date: 8/1/2020  CLINICAL HISTORY:Right-sided flank pain.  Acute kidney injury. COMPARISON: None available COMMENT: TECHNIQUE: CT of the  abdomen and pelvis was performed with the patient in the supine position. Images reconstructed/reformatted in the axial, coronal and sagittal planes. CT DOSE:  One or more dose reduction techniques (e.g. automated exposure control, adjustment of the mA and/or kV according to patient size, use of iterative reconstruction technique) utilized for this examination. ORAL CONTRAST: None INTRAVENOUS CONTRAST: No intravenous contrast given. Evaluation of the certain processes, solid organs and patency of vasculature is limited without IV contrast. LOWER CHEST: Linear atelectasis at the dependent posterior medial lung bases bilaterally.  Heart is normal in size without pericardial effusion.  Coronary artery calcification. LIVER: Within normal limits. BILE DUCTS: Normal caliber. GALLBLADDER: No calcified gallstones. Normal caliber wall. PANCREAS: Within normal limits. SPLEEN: Within normal limits.  Small splenule is noted anterior to the spleen. ADRENALS: Within normal limits. KIDNEYS/URETERS/BLADDER: Moderate right hydroureteronephrosis on the basis of an obstructing 0.8 cm calculus in the right ureterovesicular junction.  The maximum Hounsfield unit measures 582.  Additional 0.2 cm calculus in the right interpolar region.  Mild bilateral perinephric stranding, nonspecific.  No left renal hydronephrosis or calculi.  Left ureter is unremarkable. Bladder is unremarkable.  There is an exophytic mass possibly a cyst in the lower pole of the right kidney measuring about 3 cm.  This cannot be completely characterized on this noncontrast exam.  Comparison to previous studies/contrast enhanced study/ultrasound is suggested. REPRODUCTIVE ORGANS: A 1.2 cm calcification in the scrotum, nonspecific. Otherwise no pelvic masses. BOWEL: Normal caliber bowel.  Appendix is normal. Normal bowel caliber. PERITONEUM: No ascites or free air, no fluid collection VESSELS: Atherosclerotic calcification of the aorta and branching arteries. LYMPH  NODES: within normal limits. ABDOMINAL WALL: Small fat-containing umbilical hernia. BONES: Within normal limits.     IMPRESSION: 1.  Moderate right hydroureteronephrosis on the basis of an obstructing 0.8 cm calculus in the right ureterovesicular junction.  Right renal mass not completely characterized on this exam. 2.  A 1.2 cm calcification in the scrotum, nonspecific, likely benign. Findings discussed with emergency department physician assistant, Camille Mathias, on 8/1/2020 at 2:50 PM. I certify that I have personally reviewed this study and agree with this report. Kole Carey MD        ECG/Telemetry  Not indicated    ASSESSMENT AND PLAN      * Newly diagnosed diabetes (CMS/[facility])  Assessment & Plan  Patient denies a hx of diabetes  Glu 423 on admission despite poor PO intake  A1c 14.7  SSI, Hypoglycemia protocol  Patient will need insulin regimen, but states he does not have insurance  Endocrinology consult and recs appreciated    PRINCESS (acute kidney injury) (CMS/[facility])  Assessment & Plan  Denies history of renal disease though likely has undiagnosed Stage 3 CKD in the setting of his DM.  Pre-renal from volume depletion and/or intrarenal from DM or hydronephrosis  BUN/Cr 26/1.7 (29/2)  GFR 42(35)  S/p 1L IVF in the ED, continue with NSS 100cc/hr  Monitor BMP    HTN (hypertension)  Assessment & Plan  187/97 this morning  SBP 170s-180s over 24 hrs  Patient would benefit from ACEi for HTN in the setting of CAD, DM, and likely CKD  Will start Lisinopril 5mg  Will need follow up as outpt.    CAD (coronary artery disease)  Assessment & Plan  Hx CAD s/p RCA stents x2 in 5/2015  Denies cardiac complaints, EKG reviewed on admission  Reports taking Brilinta for 1 year, and states his doctor told him to stop. Denies taking aspirin, but patient would benefit from ASA for secondary prevention.  Will start ASA 81 mg  Encouraged establishing care with a PCP.    Nephrolithiasis  Assessment & Plan  CT A/P demonstrating 8mm  obstructing R UVJ stone with moderate hydroureteronephrosis  Pain control with Toradol and Dilaudid PRN  NSS at 100 cc/hr  Urology consult and recs appreciated: Stent placement today.    Hyponatremia  Assessment & Plan  Hyponatremia present on admission  Na 131 (130)  Suspect hypovolemia hyponatremia given poor PO intake and GI losses  Denies EtOH abuse  Continue IVF      Scrotal lesion  Assessment & Plan  Incidental Scrotal 1.2cm calcification   Denies scrotal pain  Urology recommending scrotal US, ordered       VTE Assessment: Padua VTE Score: 0  VTE Prophylaxis Plan: Heparin  Code Status: Full Code  Estimated Discharge Date: 8/3/2020  Disposition Planning: Continue Inpatient     Zahida Chaparro, DO  8/2/2020

## 2020-08-02 NOTE — ANESTHESIOLOGIST PRE-PROCEDURE ATTESTATION
Pre-Procedure Patient Identification:  I am the Primary Anesthesiologist and have identified the patient on 08/02/20 at 9:48 AM.   I have confirmed the following procedure(s) CYSTO, RETRO, STENT, URETEROSCOPY, LASER LITHOTRIPSY (R) will be performed by the following surgeon/proceduralist Moisés Rodriguez MD.

## 2020-08-02 NOTE — ANESTHESIA PROCEDURE NOTES
Airway  Urgency: elective    Start Time: 8/2/2020 9:58 AM  Airway not difficult    General Information and Staff    Patient location during procedure: OR  Anesthesiologist: Harman Lara MD  Resident/CRNA: Lisa Montiel CRNA  Performed: resident/CRNA     Indications and Patient Condition  Indications for airway management: anesthesia  Sedation level: deep  Preoxygenated: yes  Patient position: sniffing  Mask difficulty assessment: 0 - not attempted    Final Airway Details  Final airway type: endotracheal airway      Successful airway: ETT  Cuffed: yes   Successful intubation technique: direct laryngoscopy  Facilitating devices/methods: intubating stylet  Endotracheal tube insertion site: oral  Blade: Saurabh  Blade size: #4  ETT size (mm): 8.0  Cormack-Lehane Classification: grade IIa - partial view of glottis  Measured from: lips  ETT to lips (cm): 23  Number of attempts at approach: 1  Atraumatic airway insertion

## 2020-08-02 NOTE — NURSING NOTE
Patient left the unit for OR  at 9:42am accompanied by OR attendant, Patient is stable condition AAOx 3 at the time of departure off the unit. The patient chart was also given to the OR attendant.

## 2020-08-02 NOTE — NURSING NOTE
Nursing  OR called to get  patient ready for OR - patient  body cleanse with CHG bath wipes,  mouth care provided, VS taken, blood sugar this AM  278  6 units of insulin administered per sliding scale, patient is asymptomatic, patient remain NPO since midnight. pre-procedure, Patient voiding clear yellow clear  Urine, patient urine strained  with no stones noted;   checklist completed, patient awaiting OR  at this time.

## 2020-08-02 NOTE — ASSESSMENT & PLAN NOTE
He has had significant hyperglcycemia prior to admission- a1c 14.7%.  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  He will need to go home on insulin in the short term even if diabetes proves to be type 2.  Given his lack of medical insurance his least expensive option will be NPH and regular insulin- these can be bought without a prescrption at [facility] for $25/vial.  He will need to be taught to draw insulin with a syringe.  Will use NPH here so we can work out his dosing for discharge.  He will need a glucose meter- cheapest option will be [employer] meter and strips.  -INCREASE NPH to 20 units qAM, 12 units qHS  -INCREASE Novolog to 12 units before breakfast and dinner  -At discharge: send with scripts for NPH insulin vials and regular insulin vials (in place of the novolog) at above doses  -as he is feeling clinically well and is comfortable with above plan, I am comfortable with him going home today if he is medically stable on all other fronts.    He will need follow up in 1-2 weeks after discharge.

## 2020-08-02 NOTE — PROGRESS NOTES
Urology Progress Note    Subjective     No acute events overnight. Required hydralazine for elevated BP. Pain controlled with dilaudid, but continues to return between doses. No longer nauseous.    Objective   Temp:  [36.1 °C (97 °F)-37.1 °C (98.8 °F)] 36.1 °C (97 °F)  Heart Rate:  [] 74  Resp:  [15-20] 16  BP: (143-181)/(87-97) 177/96    Physical exam  General appearance: alert, appears stated age and cooperative  Head: normocephalic, without obvious abnormality, atraumatic  Back: Minimal tenderness in right flank to percussion  Lungs: clear to auscultation bilaterally  Heart: regular rate and rhythm  Abdomen: Soft tender to deep palpation in RLQ, no rebound tenderness, not distended  Male genitalia: Normal male genitalia, not circumcised, but testes descended, there is a soft nodule at the base of his penis that does not appear inflammed. He states he's had this since childhood  Extremities: extremities normal, warm and well-perfused; no cyanosis, clubbing, or edema  Neurologic: Grossly normal    Results from last 7 days   Lab Units 08/01/20  1213   SODIUM mEQ/L 130*   POTASSIUM mEQ/L 4.1   CHLORIDE mEQ/L 94*   CO2 mEQ/L 21*   BUN mg/dL 29*   CREATININE mg/dL 2.0*   GLUCOSE mg/dL 385*     Results from last 7 days   Lab Units 08/02/20  0517 08/01/20  1213   WBC K/uL 6.50 7.53   HEMOGLOBIN g/dL 13.7 15.8   PLATELETS K/uL 176 186     UA Results       08/01/20                          1506           Color Yellow           Clarity Clear           Glucose >=1000           Bilirubin Negative           Ketones +1           Sp Grav 1.030           Blood +1           Ph 6.0           Protein +2           Urobilinogen 0.2           Nitrite Negative           Leuk Est Negative           WBC 0 TO 3           RBC 5 TO 9           Bacteria None Seen           Comment for Ketones at 1506 on 08/01/20:    Free sulfhydryl drugs such as Mesna, Capoten, and Acetylcysteine (Mucomyst) may cause false positive ketonuria.    Comment  for Blood at 1506 on 08/01/20:    The sensitivity of the occult blood test is equivalent to approximately 4 intact RBC/HPF.    Comment for Leuk Est at 1506 on 08/01/20:    Results can be falsely negative due to high specific gravity, some antibiotics, glucose >3 g/dl, or WBC other than neutrophils.        Microbiology Results     Procedure Component Value Units Date/Time    SARS-CoV-2 (COVID-19), PCR Nasopharynx [[id number]]  (Normal) Collected:  08/01/20 1608    Specimen:  Nasopharyngeal Swab from Nasopharynx Updated:  08/01/20 1704     SARS-CoV-2 (COVID-19) Negative        Assessment/Plan   54 y.o. male being consulted for management recommendations for ureteral calculi and calcified mass at base of penis.     -Scrotal US ordered, not yet performed. Will FU  -NPO for OR today.  -Rocephin ordered for preop.  -IVF  -Pain control with dilaudid  -Strain urine    Pamela Bacon  PGY2 Urology Resident  [facility]  Pager #[phone]

## 2020-08-02 NOTE — ASSESSMENT & PLAN NOTE
Patient would benefit from ACEi for HTN in the setting of CAD, DM, and likely CKD  Increase lisinopril to 10mg  Will need follow up as outpt

## 2020-08-02 NOTE — ASSESSMENT & PLAN NOTE
Hyponatremia present on admission 130  Na 134  Suspect hypovolemia hyponatremia given poor PO intake and GI losses  Denies EtOH abuse  S/p IVF

## 2020-08-03 ENCOUNTER — APPOINTMENT (INPATIENT)
Dept: RADIOLOGY | Facility: HOSPITAL | Age: 55
DRG: 660 | End: 2020-08-03
Attending: UROLOGY

## 2020-08-03 LAB
ANION GAP SERPL CALC-SCNC: 8 MEQ/L (ref 3–15)
BUN SERPL-MCNC: 18 MG/DL (ref 8–20)
CALCIUM SERPL-MCNC: 8.4 MG/DL (ref 8.9–10.3)
CHLORIDE SERPL-SCNC: 103 MEQ/L (ref 98–109)
CO2 SERPL-SCNC: 23 MEQ/L (ref 22–32)
CREAT SERPL-MCNC: 1.1 MG/DL (ref 0.8–1.3)
GFR SERPL CREATININE-BSD FRML MDRD: >60 ML/MIN/1.73M*2
GLUCOSE BLD-MCNC: 125 MG/DL (ref 70–99)
GLUCOSE BLD-MCNC: 178 MG/DL (ref 70–99)
GLUCOSE BLD-MCNC: 299 MG/DL (ref 70–99)
GLUCOSE BLD-MCNC: 305 MG/DL (ref 70–99)
GLUCOSE SERPL-MCNC: 295 MG/DL (ref 70–99)
POCT TEST: ABNORMAL
POTASSIUM SERPL-SCNC: 4.4 MEQ/L (ref 3.6–5.1)
SODIUM SERPL-SCNC: 134 MEQ/L (ref 136–144)

## 2020-08-03 PROCEDURE — 12000000 HC ROOM AND CARE MED/SURG

## 2020-08-03 PROCEDURE — 80048 BASIC METABOLIC PNL TOTAL CA: CPT | Performed by: STUDENT IN AN ORGANIZED HEALTH CARE EDUCATION/TRAINING PROGRAM

## 2020-08-03 PROCEDURE — 76870 US EXAM SCROTUM: CPT

## 2020-08-03 PROCEDURE — 63700000 HC SELF-ADMINISTRABLE DRUG: Performed by: UROLOGY

## 2020-08-03 PROCEDURE — 99232 SBSQ HOSP IP/OBS MODERATE 35: CPT | Performed by: STUDENT IN AN ORGANIZED HEALTH CARE EDUCATION/TRAINING PROGRAM

## 2020-08-03 PROCEDURE — 86341 ISLET CELL ANTIBODY: CPT | Performed by: INTERNAL MEDICINE

## 2020-08-03 PROCEDURE — 36415 COLL VENOUS BLD VENIPUNCTURE: CPT | Performed by: INTERNAL MEDICINE

## 2020-08-03 PROCEDURE — 63600000 HC DRUGS/DETAIL CODE: Performed by: UROLOGY

## 2020-08-03 RX ORDER — CEFUROXIME AXETIL 250 MG/1
250 TABLET ORAL EVERY 12 HOURS
Status: DISCONTINUED | OUTPATIENT
Start: 2020-08-03 | End: 2020-08-04 | Stop reason: HOSPADM

## 2020-08-03 RX ORDER — INSULIN ASPART 100 [IU]/ML
12 INJECTION, SOLUTION INTRAVENOUS; SUBCUTANEOUS
Status: DISCONTINUED | OUTPATIENT
Start: 2020-08-03 | End: 2020-08-04 | Stop reason: HOSPADM

## 2020-08-03 RX ORDER — INSULIN ASPART 100 [IU]/ML
10 INJECTION, SOLUTION INTRAVENOUS; SUBCUTANEOUS ONCE
Status: COMPLETED | OUTPATIENT
Start: 2020-08-03 | End: 2020-08-03

## 2020-08-03 RX ADMIN — INSULIN ASPART 6 UNITS: 100 INJECTION, SOLUTION INTRAVENOUS; SUBCUTANEOUS at 09:30

## 2020-08-03 RX ADMIN — LISINOPRIL 5 MG: 5 TABLET ORAL at 08:29

## 2020-08-03 RX ADMIN — HEPARIN SODIUM 5000 UNITS: 5000 INJECTION, SOLUTION INTRAVENOUS; SUBCUTANEOUS at 14:29

## 2020-08-03 RX ADMIN — ASPIRIN 81 MG: 81 TABLET, COATED ORAL at 08:29

## 2020-08-03 RX ADMIN — CEFUROXIME AXETIL 250 MG: 250 TABLET, FILM COATED ORAL at 08:29

## 2020-08-03 RX ADMIN — CEFUROXIME AXETIL 250 MG: 250 TABLET, FILM COATED ORAL at 21:01

## 2020-08-03 RX ADMIN — INSULIN ASPART 10 UNITS: 100 INJECTION, SOLUTION INTRAVENOUS; SUBCUTANEOUS at 12:46

## 2020-08-03 RX ADMIN — HEPARIN SODIUM 5000 UNITS: 5000 INJECTION, SOLUTION INTRAVENOUS; SUBCUTANEOUS at 06:39

## 2020-08-03 RX ADMIN — INSULIN ASPART 2 UNITS: 100 INJECTION, SOLUTION INTRAVENOUS; SUBCUTANEOUS at 18:05

## 2020-08-03 RX ADMIN — INSULIN ASPART 12 UNITS: 100 INJECTION, SOLUTION INTRAVENOUS; SUBCUTANEOUS at 18:05

## 2020-08-03 RX ADMIN — HEPARIN SODIUM 5000 UNITS: 5000 INJECTION, SOLUTION INTRAVENOUS; SUBCUTANEOUS at 21:01

## 2020-08-03 NOTE — PLAN OF CARE
Problem: Adult Inpatient Plan of [place]: Readiness for Transition of Care-Late entry from 11:00  Intervention: Mutually Develop Transition Plan  Flowsheets (Taken 8/3/2020 5400)  Anticipated Discharge Disposition: home without services  Equipment Needed After Discharge: glucometer; other (see comments) (Insulin syringes and vials and diabetic supplies)  Discharge Coordination/Progress: Met with pt at bedside to complete asessment. Per IDR's the pt will need Glucometer\Insulin on DC and pt is currently without medical insurance. Financial services aware. Medicaid rozina Esdrasgertrude Correa to call pt. Pt has no PCP as well. [facility] pamphlet provided to pt to call # to get set up with a PCP. Information written and given to pt re going to [facility] to [facility] reli-on brand glucometer\strips lancets and syringes with pricing. Per Endocrine note-the pt will go home with Reg and NPH insulin vials that cost $25.00 from [facility] as well. Pt will need to be taught how to pull -up from a syringe prior to dc.Pt agreeable to plan on discharge and goes to [facility] at [facility] frequently. D\w RN.Pt provided with [facility]'s diabetic teaching handbook.Nutrition c\s pending. Rx for Diabetic teaching f\u at [facility] clinic placed in pts chart. Sticky Note left for MD to sign and pt instructed to call the # on the form to make an appointment and to take this form with him to his appointment. Pt states his ex wife was diabetic and he helped her inject many times. 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 + .  Assistive Device/Animal Currently Used at Home: none  Anticipated Changes Related to Illness: none  Concerns Comments: Pt s\p uretroscopy and stent placement\laserlithotrypsy for kidney stone. Also hyperglycemia with hgab a1 14.7  Transportation Concerns: car, none  Readmission Within the Last 30 Days: no previous admission in last 30 days  Patient/Family Anticipated Services at Transition: other (see comments) (OP f\u with  [facility] diabetic clinic-Rx put on pts chart for MD to sign)  Patient/Family Anticipates Transition to: home  Transportation Anticipated: car, drives self; family or friend will provide  Concerns to be Addressed: discharge planning; adjustment to diagnosis/illness

## 2020-08-03 NOTE — PROGRESS NOTES
"Endocrine Daily Progress Note    SUBJECTIVE  Patient feels well, denies abdominal pain, dry mouth, n/v, blurred vision.  Patient hopes to go home.  He reports that he has not yet been trained on insulin injection technique but feels comfortable doing this as he helped an old girlfriend do insulin injections in the past.      Visit Vitals  BP (!) 166/90 (BP Location: Left upper arm, Patient Position: Lying)   Pulse 75   Temp 36.3 °C (97.3 °F) (Temporal)   Resp 16   Ht 1.727 m (5' 8\")   Wt 93.4 kg (206 lb)   SpO2 97%   BMI 31.32 kg/m²       Physical Exam   Constitutional: He is oriented to person, place, and time. He appears well-developed and well-nourished.   HENT:   Head: Normocephalic and atraumatic.   Pulmonary/Chest: Effort normal.   Musculoskeletal: Normal range of motion.   Neurological: He is alert and oriented to person, place, and time.   Skin: Skin is warm and dry.       OBJECTIVE  Lab Results   Component Value Date    GLUCOSE 295 (H) 08/03/2020    CALCIUM 8.4 (L) 08/03/2020     (L) 08/03/2020    K 4.4 08/03/2020    CO2 23 08/03/2020     08/03/2020    BUN 18 08/03/2020    CREATININE 1.1 08/03/2020     Lab Results   Component Value Date    ALT 13 (L) 08/01/2020    AST 12 (L) 08/01/2020    ALKPHOS 125 08/01/2020    BILITOT 0.6 08/01/2020     Lab Results   Component Value Date    HGBA1C 14.7 (H) 08/01/2020     No results found for: TSH    * Newly diagnosed diabetes (CMS/[facility])  Assessment & Plan  He has had significant hyperglcycemia prior to admission- a1c 14.7%.  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  He will need to go home on insulin in the short term even if diabetes proves to be type 2.  Given his lack of medical insurance his least expensive option will be NPH and regular insulin- these can be bought without a prescrption at [facility] for $25/vial.  He will need to be " taught to draw insulin with a syringe.  Will use NPH here so we can work out his dosing for discharge.  He will need a glucose meter- cheapest option will be [employer] meter and strips.  -INCREASE NPH to 20 units qAM, 12 units qHS  -INCREASE Novolog to 12 units before breakfast and dinner  -At discharge: send with scripts for NPH insulin vials and regular insulin vials (in place of the novolog) at above doses  -as he is feeling clinically well and is comfortable with above plan, I am comfortable with him going home today if he is medically stable on all other fronts.    He will need follow up in 1-2 weeks after discharge.    PRINCESS (acute kidney injury) (CMS/[facility])  Assessment & Plan  Renal function improving    HTN (hypertension)  Assessment & Plan  Agree with addition of ACE-I    Nephrolithiasis  Assessment & Plan  S/p laser lithotripsy and ureteral stent      Guillermina Duran MD  8/3/93925:42 PM

## 2020-08-03 NOTE — PLAN OF CARE
Problem: Adult Inpatient Plan of Care  Goal: Plan of Care Review  Flowsheets (Taken 8/3/2020 1502)  Progress: no change  Plan of Care Reviewed With: patient  Outcome Summary: Pt admitted with kidney stone and found to have new DM. HgbA1c elevated at 14.7%. Pt is tolerating his diet and eating % of meals. Pt reported that he normally eats only 1-2 meals daily. Pt drinks a lot of diet iced tea and diet Monster. Encouraged more water! Pt also drinks fruit juice instead of eating breakfast-encouraged limiting regular juice. Discussed carb counting and used DM menu as a reference. Discussed breakfast and lunch ideas for at work. Encouraged walking daily for exercise once recovered.   Goals:  Pt will consume at least 75% of minimum energy/pro needs.   Improved blood glucose control/HgbA1c.   Healthy wt loss.   Recommendations:  Agree with Cardiac, Consistent Carb 1,800 kcals, NCS Diet.   Provided written and verbal education on DM.   Encouraged follow-up with endocrinology vs DM clinic at [facility].   Monitor blood glucose.

## 2020-08-03 NOTE — PROGRESS NOTES
Hospital Medicine Service -  Daily Progress Note       SUBJECTIVE   Interval History:   Patient was seen and examined at bedside. Overnight BG was 451. This morning he reports that he wants to go home, but he is agreeable to stay if necessary. He denies chest pain, SOB, abdominal pain, dysuria, and hematuria.        OBJECTIVE      Vital signs in last 24 hours:  Temp:  [36.2 °C (97.2 °F)-37.2 °C (98.9 °F)] 36.3 °C (97.3 °F)  Heart Rate:  [70-88] 75  Resp:  [12-23] 16  BP: (143-166)/(77-91) 166/90    Intake/Output Summary (Last 24 hours) at 8/3/2020 1114  Last data filed at 8/2/2020 2315  Gross per 24 hour   Intake --   Output 1200 ml   Net -1200 ml       PHYSICAL EXAMINATION      GENERAL APPEARANCE Well developed, well nourished, NAD   MUCUS MEMBRANES Moist   LUNGS CTAB, no wheezes/rales/rhonchi appreciated, no signs of respiratory distress   CHEST S1/S2, RRR, no murmurs/rubs/gallops appreciated   ABDOMEN  GENITOURINARY Soft, NT, ND, +BS  No Locke, no suprapubic TTP   EXTREMITIES PETERSON x4   SKIN No obvious rash   HEENT  NEURO Pupils equal, Anicteric  Follows commands, no dysarthria or facial asymmetry         LINES, CATHETERS, DRAINS, AIRWAYS, AND WOUNDS   Lines, Drains, Airways, Wounds:  Peripheral IV 08/01/20 Right Antecubital (Active)   Number of days: 2       Surgical Incision Penis (Active)   Number of days: 1       Comments:      LABS / IMAGING / TELE      Labs  Results from last 7 days   Lab Units 08/03/20  0826 08/02/20  0517 08/01/20  1213   SODIUM mEQ/L 134* 131* 130*   POTASSIUM mEQ/L 4.4 4.2 4.1   CHLORIDE mEQ/L 103 99 94*   CO2 mEQ/L 23 22 21*   BUN mg/dL 18 26* 29*   CREATININE mg/dL 1.1 1.7* 2.0*   GLUCOSE mg/dL 295* 269* 385*   CALCIUM mg/dL 8.4* 8.0* 8.8*       Imaging  No new images in last 24 hrs    ECG/Telemetry  Not indicated    ASSESSMENT AND PLAN      * Newly diagnosed diabetes (CMS/[facility])  Assessment & Plan  Patient denies a hx of diabetes,Glu 423 on admission despite poor PO intake  Glu  272-451 in the last 24 hrs  A1c 14.7  SSI, Hypoglycemia protocol  Endocrinology recs appreciated: Humalin 16U AM, 8U QHS. Novolog 6U BID with meals. Will need glucose monitor on dc        PRINCESS (acute kidney injury) (CMS/H[facility])  Assessment & Plan  Cr 2 on admission  Secondary to volume depletion and hydronephrosis  BUN/Cr 1.1/18 (26/1.7)  GFR >60  S/p IVF  Monitor BMP    HTN (hypertension)  Assessment & Plan  BP 160s/90s  Patient would benefit from ACEi for HTN in the setting of CAD, DM, and likely CKD  Increase lisinopril to 10mg  Will need follow up as outpt    CAD (coronary artery disease)  Assessment & Plan  Hx CAD s/p RCA stents x2 in 5/2015  Denies cardiac complaints, EKG reviewed on admission  Reports taking Brilinta for 1 year, and states his doctor told him to stop. Denies taking aspirin, but patient would benefit from ASA for secondary prevention.  Cont ASA 81 mg, started this admission  Encouraged establishing care with a PCP.    Nephrolithiasis  Assessment & Plan  CT A/P demonstrating 8mm obstructing R UVJ stone with moderate hydroureteronephrosis  S/p Laser lithotripsy 8/3  Urology recs appreciated: Stent placed 8/3, follow up 8/6 to stent removal    Hyponatremia  Assessment & Plan  Hyponatremia present on admission 130  Na 134  Suspect hypovolemia hyponatremia given poor PO intake and GI losses  Denies EtOH abuse  S/p IVF      Scrotal lesion  Assessment & Plan  Incidental Scrotal 1.2cm calcification   Denies scrotal pain  US read pending  Urology following       VTE Assessment: Padua VTE Score: 0  VTE Prophylaxis Plan: Heparin  Code Status: Full Code  Estimated Discharge Date: 8/4/2020  Disposition Planning: Continue inpatient     Zahida Chaparro, DO  8/3/2020

## 2020-08-03 NOTE — PLAN OF CARE
Problem: Adult Inpatient Plan of Care  Goal: Plan of Care Review  Outcome: Progressing  Flowsheets (Taken 8/3/2020 0552)  Progress: improving  Plan of Care Reviewed With: patient  Outcome Summary: Afebrile. Denies pain. BG-451 given 12 units Novolog per order. Voiding adequate amount dark pink urine.

## 2020-08-03 NOTE — PROGRESS NOTES
Urology Progress Note    Subjective     No acute events overnight. Has noticed hematuria and frequency, but is overall feeling much better. Sugars continue to be in 300-400s    Objective   Temp:  [36.2 °C (97.2 °F)-37.2 °C (98.9 °F)] 37.2 °C (98.9 °F)  Heart Rate:  [70-88] 73  Resp:  [12-23] 18  BP: (143-185)/(77-97) 163/81    Physical exam  General appearance: alert, appears stated age and cooperative  Head: normocephalic, without obvious abnormality, atraumatic  Back: Minimal tenderness in right flank to percussion  Lungs: clear to auscultation bilaterally  Heart: regular rate and rhythm  Abdomen: Soft tender to deep palpation in RLQ, no rebound tenderness, not distended  Male genitalia: Normal male genitalia, not circumcised, but testes descended, there is a soft nodule at the base of his penis that does not appear inflammed. He states he's had this since childhood  Extremities: extremities normal, warm and well-perfused; no cyanosis, clubbing, or edema  Neurologic: Grossly normal    Results from last 7 days   Lab Units 08/02/20  0517 08/01/20  1213   SODIUM mEQ/L 131* 130*   POTASSIUM mEQ/L 4.2 4.1   CHLORIDE mEQ/L 99 94*   CO2 mEQ/L 22 21*   BUN mg/dL 26* 29*   CREATININE mg/dL 1.7* 2.0*   GLUCOSE mg/dL 269* 385*     Results from last 7 days   Lab Units 08/02/20  0517 08/01/20  1213   WBC K/uL 6.50 7.53   HEMOGLOBIN g/dL 13.7 15.8   PLATELETS K/uL 176 186     UA Results       08/01/20                          1506           Color Yellow           Clarity Clear           Glucose >=1000           Bilirubin Negative           Ketones +1           Sp Grav 1.030           Blood +1           Ph 6.0           Protein +2           Urobilinogen 0.2           Nitrite Negative           Leuk Est Negative           WBC 0 TO 3           RBC 5 TO 9           Bacteria None Seen           Comment for Ketones at 1506 on 08/01/20:    Free sulfhydryl drugs such as Mesna, Capoten, and Acetylcysteine (Mucomyst) may cause false  positive ketonuria.    Comment for Blood at 1506 on 08/01/20:    The sensitivity of the occult blood test is equivalent to approximately 4 intact RBC/HPF.    Comment for Leuk Est at 1506 on 08/01/20:    Results can be falsely negative due to high specific gravity, some antibiotics, glucose >3 g/dl, or WBC other than neutrophils.          Assessment/Plan   54 y.o. male being consulted for management recommendations for ureteral calculi and calcified mass at base of penis.      -Scrotal US ordered, not yet performed. Will FU  -Now s/p lithotripsy of stone and stent placement  -Continue Ceftin BID until stent removal. Scripts are printed in chart.  -Flomax while stent is in place  -Care per primary. OK for discharge from Urology standpoint but sugars are not well controlled as of yet.      Pamela Bacon  PGY2 Urology Resident  [facility]  Pager #[phone]

## 2020-08-04 VITALS
DIASTOLIC BLOOD PRESSURE: 83 MMHG | SYSTOLIC BLOOD PRESSURE: 165 MMHG | BODY MASS INDEX: 31.22 KG/M2 | HEIGHT: 68 IN | WEIGHT: 206 LBS | TEMPERATURE: 97.9 F | OXYGEN SATURATION: 96 % | HEART RATE: 67 BPM | RESPIRATION RATE: 16 BRPM

## 2020-08-04 PROBLEM — N17.9 AKI (ACUTE KIDNEY INJURY) (CMS/HCC): Status: RESOLVED | Noted: 2020-08-01 | Resolved: 2020-08-04

## 2020-08-04 LAB
BACTERIA UR CULT: NORMAL
GLUCOSE BLD-MCNC: 202 MG/DL (ref 70–99)
POCT TEST: ABNORMAL

## 2020-08-04 PROCEDURE — 63700000 HC SELF-ADMINISTRABLE DRUG: Performed by: UROLOGY

## 2020-08-04 PROCEDURE — 63700000 HC SELF-ADMINISTRABLE DRUG: Performed by: FAMILY MEDICINE

## 2020-08-04 PROCEDURE — 63600000 HC DRUGS/DETAIL CODE: Performed by: UROLOGY

## 2020-08-04 PROCEDURE — 99238 HOSP IP/OBS DSCHRG MGMT 30/<: CPT | Performed by: STUDENT IN AN ORGANIZED HEALTH CARE EDUCATION/TRAINING PROGRAM

## 2020-08-04 PROCEDURE — 63700000 HC SELF-ADMINISTRABLE DRUG: Performed by: STUDENT IN AN ORGANIZED HEALTH CARE EDUCATION/TRAINING PROGRAM

## 2020-08-04 RX ORDER — ASPIRIN 81 MG/1
81 TABLET ORAL DAILY
Qty: 30 TABLET | Refills: 0 | Status: SHIPPED | OUTPATIENT
Start: 2020-08-04 | End: 2020-09-03

## 2020-08-04 RX ORDER — LISINOPRIL 10 MG/1
10 TABLET ORAL DAILY
Status: DISCONTINUED | OUTPATIENT
Start: 2020-08-04 | End: 2020-08-04 | Stop reason: HOSPADM

## 2020-08-04 RX ORDER — NAPHAZOLINE HYDROCHLORIDE AND POLYETHYLENE GLYCOL 300 .1; 2 MG/ML; MG/ML
SOLUTION/ DROPS OPHTHALMIC
Qty: 100 EACH | Refills: 1 | Status: SHIPPED | OUTPATIENT
Start: 2020-08-04 | End: 2021-08-04

## 2020-08-04 RX ORDER — CEFUROXIME AXETIL 250 MG/1
250 TABLET ORAL 2 TIMES DAILY
Qty: 6 TABLET | Refills: 0 | Status: SHIPPED | OUTPATIENT
Start: 2020-08-04 | End: 2020-08-07

## 2020-08-04 RX ORDER — LISINOPRIL 10 MG/1
10 TABLET ORAL DAILY
Qty: 30 TABLET | Refills: 0 | Status: SHIPPED | OUTPATIENT
Start: 2020-08-04 | End: 2020-09-03

## 2020-08-04 RX ORDER — INSULIN ASPART 100 [IU]/ML
12 INJECTION, SOLUTION INTRAVENOUS; SUBCUTANEOUS
Qty: 5 PEN | Refills: 5 | Status: CANCELLED | OUTPATIENT
Start: 2020-08-04 | End: 2020-09-03

## 2020-08-04 RX ADMIN — Medication 1 SPRAY: at 00:00

## 2020-08-04 RX ADMIN — INSULIN ASPART 12 UNITS: 100 INJECTION, SOLUTION INTRAVENOUS; SUBCUTANEOUS at 08:10

## 2020-08-04 RX ADMIN — LISINOPRIL 10 MG: 10 TABLET ORAL at 08:09

## 2020-08-04 RX ADMIN — HEPARIN SODIUM 5000 UNITS: 5000 INJECTION, SOLUTION INTRAVENOUS; SUBCUTANEOUS at 06:51

## 2020-08-04 RX ADMIN — CEFUROXIME AXETIL 250 MG: 250 TABLET, FILM COATED ORAL at 08:09

## 2020-08-04 RX ADMIN — ASPIRIN 81 MG: 81 TABLET, COATED ORAL at 08:10

## 2020-08-04 RX ADMIN — INSULIN ASPART 4 UNITS: 100 INJECTION, SOLUTION INTRAVENOUS; SUBCUTANEOUS at 08:10

## 2020-08-04 NOTE — PROGRESS NOTES
Urology Progress Note    Subjective     No acute events overnight. Sugars better controlled this am. Feeling well, stent not bothering him.    Objective   Temp:  [36.3 °C (97.3 °F)-36.8 °C (98.2 °F)] 36.4 °C (97.6 °F)  Heart Rate:  [67-75] 67  Resp:  [16] 16  BP: (151-188)/(77-96) 151/77    Physical exam  General appearance: alert, appears stated age and cooperative  Head: normocephalic, without obvious abnormality, atraumatic  Back: Minimal tenderness in right flank to percussion  Lungs: clear to auscultation bilaterally  Heart: regular rate and rhythm  Abdomen: Soft tender to deep palpation in RLQ, no rebound tenderness, not distended  Male genitalia: Normal male genitalia, not circumcised, but testes descended, there is a soft nodule at the base of his penis that does not appear inflammed. He states he's had this since childhood  Extremities: extremities normal, warm and well-perfused; no cyanosis, clubbing, or edema  Neurologic: Grossly normal    Results from last 7 days   Lab Units 08/03/20  0826 08/02/20  0517 08/01/20  1213   SODIUM mEQ/L 134* 131* 130*   POTASSIUM mEQ/L 4.4 4.2 4.1   CHLORIDE mEQ/L 103 99 94*   CO2 mEQ/L 23 22 21*   BUN mg/dL 18 26* 29*   CREATININE mg/dL 1.1 1.7* 2.0*   GLUCOSE mg/dL 295* 269* 385*     Results from last 7 days   Lab Units 08/02/20  0517 08/01/20  1213   WBC K/uL 6.50 7.53   HEMOGLOBIN g/dL 13.7 15.8   PLATELETS K/uL 176 186     UA Results       08/01/20                          1506           Color Yellow           Clarity Clear           Glucose >=1000           Bilirubin Negative           Ketones +1           Sp Grav 1.030           Blood +1           Ph 6.0           Protein +2           Urobilinogen 0.2           Nitrite Negative           Leuk Est Negative           WBC 0 TO 3           RBC 5 TO 9           Bacteria None Seen           Comment for Ketones at 1506 on 08/01/20:    Free sulfhydryl drugs such as Mesna, Capoten, and Acetylcysteine (Mucomyst) may cause  false positive ketonuria.    Comment for Blood at 1506 on 08/01/20:    The sensitivity of the occult blood test is equivalent to approximately 4 intact RBC/HPF.    Comment for Leuk Est at 1506 on 08/01/20:    Results can be falsely negative due to high specific gravity, some antibiotics, glucose >3 g/dl, or WBC other than neutrophils.          Assessment/Plan   54 y.o. male being consulted for management recommendations for ureteral calculi and calcified mass at base of penis.      -Scrotal US without significant findings.  -Now s/p lithotripsy of stone and stent placement  -Continue Ceftin BID until stent removal. Scripts are printed in chart.  -Flomax while stent is in place  -Care per primary. OK for discharge from Urology standpoint once cleared by the medical team.     Pamela Bacon  PGY2 Urology Resident  [facility]  Pager #[phone]

## 2020-08-04 NOTE — PLAN OF CARE
Problem: Adult Inpatient Plan of Care  Goal: Plan of Care Review  Outcome: Progressing  Flowsheets (Taken 8/4/2020 8082)  Outcome Summary: Blood sugar 125, no coverage needed, up ad miguelina  ambulating in room, Diabetic teaching started needs reinforcement. Denies pain

## 2020-08-04 NOTE — NURSING NOTE
Reviewed dc instructions with pt and verbalized understanding. Reviewed diabetes education and verbalized understanding. Routine dc to home.

## 2020-08-04 NOTE — DISCHARGE SUMMARY
Hospital Medicine Service -  Inpatient Discharge Summary        BRIEF OVERVIEW   Admitting Provider: Zahida Chaparro DO  Attending Provider: No att. providers found Attending phys phone: N/A    PCP: Seamus States, No Pcp [phone]    Admission Date: 8/1/2020  Discharge Date: 8/4/2020     DISCHARGE DIAGNOSES      Primary Discharge Diagnosis  Newly diagnosed diabetes (CMS/[facility])    Secondary Discharge Diagnoses  Active Hospital Problems    Diagnosis Date Noted   • Newly diagnosed diabetes (CMS/[facility]) 08/01/2020     Priority: High   • Nephrolithiasis 08/01/2020     Priority: Medium   • CAD (coronary artery disease) 08/01/2020     Priority: Medium   • HTN (hypertension) 08/01/2020     Priority: Medium   • Hyponatremia 08/02/2020   • Scrotal lesion 08/01/2020      Resolved Hospital Problems    Diagnosis Date Noted Date Resolved   • PRINCESS (acute kidney injury) (CMS/[facility]) 08/01/2020 08/04/2020     Priority: Medium       Problem List on Day of Discharge  * Newly diagnosed diabetes (CMS/[facility])  Assessment & Plan  Patient denies a hx of diabetes,Glu 423 on admission despite poor PO intake  Glu 272-451 in the last 24 hrs  A1c 14.7  SSI, Hypoglycemia protocol  Endocrinology recs appreciated: Humalin 16U AM, 8U QHS. Novolog 6U BID with meals. Will need glucose monitor on dc        HTN (hypertension)  Assessment & Plan  BP 160s/90s  Patient would benefit from ACEi for HTN in the setting of CAD, DM, and likely CKD  Increase lisinopril to 10mg  Will need follow up as outpt    CAD (coronary artery disease)  Assessment & Plan  Hx CAD s/p RCA stents x2 in 5/2015  Denies cardiac complaints, EKG reviewed on admission  Reports taking Brilinta for 1 year, and states his doctor told him to stop. Denies taking aspirin, but patient would benefit from ASA for secondary prevention.  Cont ASA 81 mg, started this admission  Encouraged establishing care with a PCP.    Nephrolithiasis  Assessment & Plan  CT A/P demonstrating 8mm obstructing R UVJ  stone with moderate hydroureteronephrosis  S/p Laser lithotripsy 8/3  Urology recs appreciated: Stent placed 8/3, follow up 8/6 to stent removal    Hyponatremia  Assessment & Plan  Hyponatremia present on admission 130  Na 134  Suspect hypovolemia hyponatremia given poor PO intake and GI losses  Denies EtOH abuse  S/p IVF      Scrotal lesion  Assessment & Plan  Incidental Scrotal 1.2cm calcification   Denies scrotal pain  US read pending  Urology following        SUMMARY OF HOSPITALIZATION      Presenting Problem/History of Present Illness  Newly diagnosed diabetes (CMS/[facility])    This is a 54 y.o. year-old male admitted on 8/1/2020 with Nephrolithiasis [N20.0]  Newly diagnosed diabetes (CMS/HCC) [E11.9].      HPI Dilermano Anjos is a 54 y.o. male with a past medical history of CAD s/p stents x2 in 2015 and HTN who presents from urgent care for evaluation of hyperglycemia and abdominal pain. Patient states right sided abdominal pain began 5 days ago and has been worsening in nature. He admits to associated nausea and vomiting that started 1 days prior to presentation. Today he presented to urgent care, and was told to come to the emergency room for high blood sugar and further work up. Patient denies a history of hyperglycemia, diabetes, or renal disease. He reports being evaluated in a Hospital in [place] 5 months ago for abdominal pain. He states an ultrasound of his kidneys were normal, and he denies having a CT done at that time.     In the ED, initial glucose was 423 and patient reported poor PO intake over the last few days with his nausea. CT of the A/P demonstrated an 8mm, obstructing R UVJ calculus with moderate hydroureteronephrosis.    Hospital Course  The patient was admitted to the hospital for the 8mm R obstructing UVJ calculus with moderate hydroureteronephrosis. He was seen by urology and underwent laser lithotripsy and stent placement on 8/2/2020. He was found to have newly diagnosed IDDM with A1c  "14.7. He was evaluated by Endocrinology and started on an insulin regimen. He was started on Lisinopril for HTN, and ASA with his history of CAD s/p stent. He was recommended to establish care with a PCP, follow up with Urology on 8/6/2020 for stent removal, and follow with endocrinology in 1-2 weeks for further management. Patient was seen and evaluated on the day of discharge. Prescriptions were provided, along with detailed instructions.    Exam on Day of Discharge  GENERAL APPEARANCE Well developed, well nourished, AAOx3, NAD   MUCUS MEMBRANES Moist   LUNGS CTAB, no wheezes/rales/rhonchi appreciated, no signs of respiratory distress   CHEST S1/S2, RRR, no murmurs/rubs/gallops appreciated   ABDOMEN  GENITOURINARY Soft, NT, ND, +BS  No Locke, no suprapubic TTP   EXTREMITIES PETERSON x4   SKIN No obvious rash   HEENT  NEURO Pupils equal, Anicteric  Follows commands          Consults During Admission  IP CONSULT TO ENDOCRINOLOGY    DISCHARGE MEDICATIONS     PENDING ORDERS (720h ago, onward)         Ordered     cefTRIAXone (ROCEPHIN) IVPB 1 g in 100 mL NSS vial in bag  Once,   Discontinued:  --,   Status:  Canceled      Signed and Held     Oxygen Therapy - Nasal Cannula; 2 lpm  Continuous,   Discontinued:  --      Signed and Held               Medication List      START taking these medications    aspirin 81 mg enteric coated tablet  Take 1 tablet (81 mg total) by mouth daily.  Dose:  81 mg     cefUROXime 250 mg tablet  Commonly known as:  CEFTIN  Take 1 tablet (250 mg total) by mouth 2 (two) times a day for 3 days.  Dose:  250 mg     insulin NPH U-100 human 100 unit/mL injection  Commonly known as:  HumuLIN,NovoLIN  Inject 20 Units under the skin every morning  Inject 12 Units under the skin every night     insulin regular 100 unit/mL injection  Commonly known as:  HumuLIN R,NovoLIN R  Inject 12 Units under the skin 2 (two) times a day before breakfast and dinner     insulin syringe-needle U-100 0.5 mL 29 gauge x 1/2\" " syringe  Use to inject 1-4 times daily.     lisinopriL 10 mg tablet  Commonly known as:  PRINIVIL  Take 1 tablet (10 mg total) by mouth daily.  Dose:  10 mg     tamsulosin 0.4 mg capsule  Commonly known as:  FLOMAX  Take 1 capsule (0.4 mg total) by mouth daily for 5 days.  Dose:  0.4 mg            Instructions for after discharge     Follow-up with department      Please follow up with Endocrinology, Dr. Kevin, in 1 week to discuss this hospitalization and for further management    [facility]   [phone]    [address] [address]       Follow-up with primary physician (PCP)      Please follow up with your PCP in one week to discuss this hospitalization and for further management. You were provided with information to established care with a Primary Care Doctor.    Follow-up with provider      Please follow up with Urology for ureteral stent removal on 8/6/2020    Moisés Rodriguez MD   [phone]    [address]  [address]  [facility] [address]                PROCEDURES / LABS / IMAGING      Operative Procedures  8/2/2020 Urological Laser Lithotripsy, R Ureteral stent placement    Other Procedures  n/a    Pertinent Labs  Results from last 7 days   Lab Units 08/03/20  0826 08/02/20  0517 08/01/20  1213   SODIUM mEQ/L 134* 131* 130*   POTASSIUM mEQ/L 4.4 4.2 4.1   CHLORIDE mEQ/L 103 99 94*   CO2 mEQ/L 23 22 21*   BUN mg/dL 18 26* 29*   CREATININE mg/dL 1.1 1.7* 2.0*   GLUCOSE mg/dL 295* 269* 385*   CALCIUM mg/dL 8.4* 8.0* 8.8*       Results from last 7 days   Lab Units 08/02/20  0517 08/01/20  1213   WBC K/uL 6.50 7.53   HEMOGLOBIN g/dL 13.7 15.8   HEMATOCRIT % 39.2* 44.4   PLATELETS K/uL 176 186     A1c 14.7    Pertinent Imaging  Ct Abdomen Pelvis Without Iv Contrast    Result Date: 8/1/2020  IMPRESSION: 1.  Moderate right hydroureteronephrosis on the basis of an obstructing 0.8 cm calculus in the right ureterovesicular junction.  Right renal mass not  completely characterized on this exam. 2.  A 1.2 cm calcification in the scrotum, nonspecific, likely benign. Findings discussed with emergency department physician assistant, Camille Mathias, on 8/1/2020 at 2:50 PM. I certify that I have personally reviewed this study and agree with this report. Kole Carey MD    Ultrasound Scrotum    Result Date: 8/3/2020  IMPRESSION: Right scrotal zulma. COMMENT: Comparison: CT 8/1/2020 TECHNIQUE: Ultrasound of the scrotum was performed. RIGHT: Right testis: Testicle is normal in size, echotexture and echogenicity. Testicle measures 2.2 x 4.0 x 2.8 cm, for an estimated volume of 13 mL. Right epididymal head: Normal. Hydrocele: Small hydrocele.  Multiple the imaged intrascrotal, extratesticular calcification measuring 0.4 cm. Varicocele: None. LEFT: Left testis: Testicle is normal in size, echotexture and echogenicity. Testicle measures 1.9 x 4.0 x 2.5 cm, for an estimated volume of 9.7 mL. Left epididymal head: Normal. Hydrocele: None significant. Varicocele: None.       OUTPATIENT  FOLLOW-UP / REFERRALS / PENDING TESTS        Outpatient Follow-Up Appointments  Encounter Information     You do not currently have any appointments scheduled.          Referrals  No orders of the defined types were placed in this encounter.      Test Results Pending at Discharge  Unresulted Labs (From admission, onward)     Start     Ordered    08/03/20 0600  Glutamic Acid Decarboxylase-65 Antibody  Morning draw      08/02/20 1441    08/02/20 1059  Stone Analysis     Comments:  Specimen B: Pre-op diagnosis:  Nephrolithiasis [N20.0]     Start Status     08/02/20 1059 In process Order ID: [id number]       08/02/20 1059                Important Issues to Address in Follow-Up  Urology stent removal  Insulin dependent diabetes  Essential HTN  CAD    DISCHARGE DISPOSITION      Disposition: Home     Code Status At Discharge: Prior    Physician Order for Life-Sustaining Treatment Document Status      No  documents found

## 2020-08-04 NOTE — DISCHARGE INSTRUCTIONS
You were admitted to the hospital for a kidney stone. You were seen by Urology, the stone was removed, and a stent was left in your right ureter. Please follow up with Dr. Rodriguez as described below.    You were diagnosed with diabetes. You were seen by Endocrinology and started on INSULIN. Please follow up with Endocrinology in 1 week.    You were diagnosed with hypertension (high blood pressure). You were started on a new medication LISINOPRIL. You were also started on ASPIRIN to protect your heart and your heart stents. Please follow up with a primary care doctor for further management.     Call Urology, 's Office at [phone] and make a follow up appointment on Thursday 8/6 for stent removal.  Please take Ceftin 250 mg twice a day until your stent is removed.  Continue taking flomax while you have your stent.    Return to the nearest emergency department if you experience any of the following:  -Fever greater than 100.4  -Uncontrollable Pain  -Uncontrollable Nausea or Vomiting  -Uncontrollable bleeding  -Inability to urinate  -Anything else you feel needs Emergent medical attention      Diabetes Mellitus and Standards of Medical Care  Managing diabetes (diabetes mellitus) can be complicated. Your diabetes treatment may be managed by a team of health care providers, including:  · A physician who specializes in diabetes (endocrinologist).  · A nurse practitioner or physician assistant.  · Nurses.  · A diet and nutrition specialist (registered dietitian).  · A certified diabetes educator (CDE).  · An exercise specialist.  · A pharmacist.  · An eye doctor.  · A foot specialist (podiatrist).  · A dentist.  · A primary care provider.  · A mental health provider.  Your health care providers follow guidelines to help you get the best quality of care. The following schedule is a general guideline for your diabetes management plan. Your health care providers may give you more specific  instructions.  Physical exams  Upon being diagnosed with diabetes mellitus, and each year after that, your health care provider will ask about your medical and family history. He or she will also do a physical exam. Your exam may include:  · Measuring your height, weight, and body mass index (BMI).  · Checking your blood pressure. This will be done at every routine medical visit. Your target blood pressure may vary depending on your medical conditions, your age, and other factors.  · Thyroid gland exam.  · Skin exam.  · Screening for damage to your nerves (peripheral neuropathy). This may include checking the pulse in your legs and feet and checking the level of sensation in your hands and feet.  · A complete foot exam to inspect the structure and skin of your feet, including checking for cuts, bruises, redness, blisters, sores, or other problems.  · Screening for blood vessel (vascular) problems, which may include checking the pulse in your legs and feet and checking your temperature.  Blood tests  Depending on your treatment plan and your personal needs, you may have the following tests done:  · HbA1c (hemoglobin A1c). This test provides information about blood sugar (glucose) control over the previous 2-3 months. It is used to adjust your treatment plan, if needed. This test will be done:  ? At least 2 times a year, if you are meeting your treatment goals.  ? 4 times a year, if you are not meeting your treatment goals or if treatment goals have changed.  · Lipid testing, including total, LDL, and HDL cholesterol and triglyceride levels.  ? The goal for LDL is less than 100 mg/dL (5.5 mmol/L). If you are at high risk for complications, the goal is less than 70 mg/dL (3.9 mmol/L).  ? The goal for HDL is 40 mg/dL (2.2 mmol/L) or higher for men and 50 mg/dL (2.8 mmol/L) or higher for women. An HDL cholesterol of 60 mg/dL (3.3 mmol/L) or higher gives some protection against heart disease.  ? The goal for triglycerides  is less than 150 mg/dL (8.3 mmol/L).  · Liver function tests.  · Kidney function tests.  · Thyroid function tests.  Dental and eye exams  · Visit your dentist two times a year.  · If you have type 1 diabetes, your health care provider may recommend an eye exam 3-5 years after you are diagnosed, and then once a year after your first exam.  ? For children with type 1 diabetes, a health care provider may recommend an eye exam when your child is age 10 or older and has had diabetes for 3-5 years. After the first exam, your child should get an eye exam once a year.  · If you have type 2 diabetes, your health care provider may recommend an eye exam as soon as you are diagnosed, and then once a year after your first exam.  Immunizations    · The yearly flu (influenza) vaccine is recommended for everyone 6 months or older who has diabetes.  · The pneumonia (pneumococcal) vaccine is recommended for everyone 2 years or older who has diabetes. If you are 65 or older, you may get the pneumonia vaccine as a series of two separate shots.  · The hepatitis B vaccine is recommended for adults shortly after being diagnosed with diabetes.  · Adults and children with diabetes should receive all other vaccines according to age-specific recommendations from the Centers for Disease Control and Prevention (CDC).  Mental and emotional health  Screening for symptoms of eating disorders, anxiety, and depression is recommended at the time of diagnosis and afterward as needed. If your screening shows that you have symptoms (positive screening result), you may need more evaluation and you may work with a mental health care provider.  Treatment plan  Your treatment plan will be reviewed at every medical visit. You and your health care provider will discuss:  · How you are taking your medicines, including insulin.  · Any side effects you are experiencing.  · Your blood glucose target goals.  · The frequency of your blood glucose  monitoring.  · Lifestyle habits, such as activity level as well as tobacco, alcohol, and substance use.  Diabetes self-management education  Your health care provider will assess how well you are monitoring your blood glucose levels and whether you are taking your insulin correctly. He or she may refer you to:  · A certified diabetes educator to manage your diabetes throughout your life, starting at diagnosis.  · A registered dietitian who can create or review your personal nutrition plan.  · An exercise specialist who can discuss your activity level and exercise plan.  Summary  · Managing diabetes (diabetes mellitus) can be complicated. Your diabetes treatment may be managed by a team of health care providers.  · Your health care providers follow guidelines in order to help you get the best quality of care.  · Standards of care including having regular physical exams, blood tests, blood pressure monitoring, immunizations, screening tests, and education about how to manage your diabetes.  · Your health care providers may also give you more specific instructions based on your individual health.  This information is not intended to replace advice given to you by your health care provider. Make sure you discuss any questions you have with your health care provider.  Document Released: 10/15/2010 Document Revised: 09/06/2019 Document Reviewed: 09/15/2017  [employer] Interactive Patient Education © 2019 [employer]

## 2020-08-05 LAB — GAD65 AB SER IA-ACNC: <5 IU/ML

## 2020-08-06 LAB
COMPN STONE: NORMAL
ORIGIN STONE: NORMAL
WT STONE: 0.04 G

## 2021-08-25 ENCOUNTER — HOSPITAL ENCOUNTER (EMERGENCY)
Facility: HOSPITAL | Age: 56
Discharge: HOME | End: 2021-08-25
Attending: EMERGENCY MEDICINE

## 2021-08-25 ENCOUNTER — APPOINTMENT (EMERGENCY)
Dept: CT IMAGING | Facility: HOSPITAL | Age: 56
End: 2021-08-25

## 2021-08-25 VITALS
SYSTOLIC BLOOD PRESSURE: 152 MMHG | TEMPERATURE: 98.5 F | OXYGEN SATURATION: 98 % | HEIGHT: 72 IN | BODY MASS INDEX: 29.12 KG/M2 | WEIGHT: 215 LBS | HEART RATE: 92 BPM | RESPIRATION RATE: 18 BRPM | DIASTOLIC BLOOD PRESSURE: 83 MMHG

## 2021-08-25 DIAGNOSIS — S01.511A LIP LACERATION, INITIAL ENCOUNTER: ICD-10-CM
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
## 2021-08-25 DIAGNOSIS — S02.5XXA CHIPPED TOOTH: ICD-10-CM

## 2021-08-25 PROCEDURE — G1004 CDSM NDSC: HCPCS

## 2021-08-25 PROCEDURE — 70450 CT HEAD/BRAIN W/O DYE: CPT

## 2021-08-25 PROCEDURE — 99282 EMERGENCY DEPT VISIT SF MDM: CPT | Performed by: EMERGENCY MEDICINE

## 2021-08-25 PROCEDURE — 12051 INTMD RPR FACE/MM 2.5 CM/<: CPT | Performed by: EMERGENCY MEDICINE

## 2021-08-25 PROCEDURE — 99284 EMERGENCY DEPT VISIT MOD MDM: CPT

## 2021-08-25 RX ORDER — LIDOCAINE HYDROCHLORIDE 10 MG/ML
5 INJECTION, SOLUTION EPIDURAL; INFILTRATION; INTRACAUDAL; PERINEURAL ONCE
Status: COMPLETED | OUTPATIENT
Start: 2021-08-25 | End: 2021-08-25

## 2021-08-25 RX ADMIN — LIDOCAINE HYDROCHLORIDE 5 ML: 10 INJECTION, SOLUTION EPIDURAL; INFILTRATION; INTRACAUDAL at 11:54

## 2021-08-25 NOTE — ED PROVIDER NOTES
Emergency Department Trauma Note  Kel Langley 48 y o  male MRN: [id number]  Unit/Bed#: ED 06/ED 06 Encounter: [id number]      Trauma Alert: Trauma Acuity: C  Model of Arrival:   via    Trauma Team: Current Providers  Attending Provider: Sean Pete MD  Registered Nurse: Donnie Lan RN  Consultants: None      History of Present Illness        Chief Complaint:   Chief Complaint   Patient presents with    Fall     lip laceration after falling and hitting face on rock  daily asa, denies loc  HPI:  Kel Langley is a 48 y o  male  Mechanism:Details of Incident: fall from standing on ASA daily, lip lac Injury Date: 08/25/21 Injury Time: 80      52-year-old male presenting as a level C trauma activation  Patient was at work when he lost his balance and fell, hitting his mouth in his head on a rock on the ground  Denies loss of consciousness  Takes 325 milligrams of aspirin daily  He has a deep laceration to the middle of his upper lip  No headache, neck pain, back pain, or pain in the extremities  Tetanus up-to-date  Also reports cracking 1 of his front teeth and feeling like his front teeth are loose  HPI  Review of Systems   Constitutional: Negative for chills and fever  HENT: Positive for dental problem  Negative for congestion  Eyes: Negative for visual disturbance  Respiratory: Negative for cough and shortness of breath  Cardiovascular: Negative for chest pain and leg swelling  Gastrointestinal: Negative for abdominal pain, nausea and vomiting  Genitourinary: Negative for flank pain  Musculoskeletal: Negative for arthralgias, back pain, neck pain and neck stiffness  Skin: Positive for wound  Negative for rash  Neurological: Negative for weakness, numbness and headaches  Psychiatric/Behavioral: Negative for agitation, behavioral problems and confusion  Historical Information     Immunizations:    There is no immunization history on file for this patient  Past Medical History:   Diagnosis Date    Diabetes mellitus ([facility] [address] )     Hypertension      History reviewed  No pertinent family history  Past Surgical History:   Procedure Laterality Date    CORONARY ANGIOPLASTY WITH STENT PLACEMENT      x2     Social History     Tobacco Use    Smoking status: Never Smoker    Smokeless tobacco: Never Used   Substance Use Topics    Alcohol use: Not on file    Drug use: Not on file     E-Cigarette/Vaping     E-Cigarette/Vaping Substances       Family History: non-contributory    Meds/Allergies   None       No Known Allergies    PHYSICAL EXAM    PE limited by: none    Objective   Vitals:   First set: Temperature: 98 5 °F (36 9 °C) (08/25/21 1034)  Pulse: 97 (08/25/21 1032)  Respirations: 18 (08/25/21 1032)  Blood Pressure: 160/72 (08/25/21 1034)  SpO2: 98 % (08/25/21 1032)    Primary Survey:   (A) Airway: intact  (B) Breathing: normal bilateral breath sounds  (C) Circulation: Pulses:   normal  (D) Disabliity:  GCS Total:  15  (E) Expose:  Completed    Secondary Survey: (Click on Physical Exam tab above)  Physical Exam  Constitutional:       General: He is not in acute distress  Appearance: He is well-developed  He is not diaphoretic  HENT:      Head: Normocephalic  Comments: No tenderness to palpation of the bones of the face     Right Ear: External ear normal       Left Ear: External ear normal       Nose: Nose normal       Comments: No nasal septal hematoma, nares clear     Mouth/Throat:      Mouth: Mucous membranes are moist       Pharynx: Oropharynx is clear  Comments: Class 1 fracture to tooth 10  Mild loosening of teeth 9 and 10 however both are seated appropriately within the gum line  Superficial laceration to the gums above tooth 9  Eyes:      Extraocular Movements: Extraocular movements intact  Conjunctiva/sclera: Conjunctivae normal       Pupils: Pupils are equal, round, and reactive to light     Neck:      Comments: No midline tenderness to palpation over the cervical spine  Cardiovascular:      Rate and Rhythm: Normal rate and regular rhythm  Heart sounds: Normal heart sounds  No murmur heard  No friction rub  No gallop  Pulmonary:      Effort: Pulmonary effort is normal  No respiratory distress  Breath sounds: Normal breath sounds  No wheezing or rales  Comments: Chest wall nontender  Abdominal:      General: Bowel sounds are normal  There is no distension  Palpations: Abdomen is soft  Tenderness: There is no abdominal tenderness  There is no guarding  Musculoskeletal:         General: No deformity  Normal range of motion  Cervical back: Normal range of motion and neck supple  Comments: No midline tenderness to palpation over the T or L-spine  Extremities atraumatic  Skin:     General: Skin is warm and dry  Comments: 2 centimeter vertical laceration through the middle of the upper lip, extends through the musculature and crosses the vermilion border  Neurological:      Mental Status: He is alert and oriented to person, place, and time  Motor: No abnormal muscle tone  Comments: Normal speech  Normal gait  5/5 strength in the bilateral upper and lower extremities  Psychiatric:         Mood and Affect: Mood normal          Cervical spine cleared by clinical criteria? Yes     Invasive Devices     None                 Lab Results:   Results Reviewed     None                 Imaging Studies:   Direct to CT: No  CT head without contrast   Final Result by Nery Gillespie MD (08/25 1139)      1  No acute intracranial abnormality  2   Single subcentimeter parenchymal calcification in the right frontal lobe is nonspecific  There is no associated mass effect              Workstation performed: [id number]               Procedures  Laceration repair    Date/Time: 8/25/2021 5:28 PM  Performed by: Brennan Storey MD  Authorized by: Brennan Storey MD   Consent: Verbal consent obtained  Consent given by: patient  Patient identity confirmed: verbally with patient  Body area: head/neck  Location details: upper lip  Full thickness lip laceration: yes  Vermilion border involved: yes  Lip laceration height: more than half vertical height  Laceration length: 2 cm  Tendon involvement: none  Nerve involvement: none  Vascular damage: no  Anesthesia: local infiltration    Anesthesia:  Local Anesthetic: lidocaine 1% without epinephrine  Anesthetic total: 5 mL    Sedation:  Patient sedated: no      Wound Dehiscence:    Secondary closure or dehiscence: complex    Procedure Details:  Preparation: Patient was prepped and draped in the usual sterile fashion  Irrigation solution: saline  Irrigation method: syringe  Amount of cleaning: extensive  Debridement: none  Wound skin closure material used: 5-0 plain gut  Wound subcutaneous closure material used: 5-0 plain gut  Technique: vertical mattress and simple  Approximation: close  Approximation difficulty: complex  Lip approximation: vermillion border well aligned  Patient tolerance: patient tolerated the procedure well with no immediate complications               ED Course           MDM  Number of Diagnoses or Management Options  Chipped tooth  Fall, initial encounter: new and requires workup  Lip laceration, initial encounter: new and requires workup  Diagnosis management comments: CT head with no acute intracranial abnormalities the patient does have a small subcentimeter nonspecific calcification to the right frontal lobe without associated mass effect  He denies headaches and has a normal neurologic exam   Recommend follow-up with his doctor to discuss possibility of additional imaging or Neurology follow-up if deemed appropriate  Exam reveals a superficial laceration to the gum line above the 2 front teeth, and a class 1 fracture to tooth 10   There is also mild loosing of teeth 9 and 10, the patient states like he feels like tooth 10 a was "bent backwards" right after his fall but he was able to straighten it  He has no palpable tenderness to the bones of the jaw or face  Recommend follow-up with Oral maxillofacial surgery expeditiously for further evaluation of his loose teeth  Loosening is minor and the teeth do remain firmly in place so no indication for splinting, however he was counseled to avoid biting into food using his front teeth until he follows up with [facility]  Laceration repaired by resident physician with myself at bedside  Multilayered closure performed with care to approximate the vermilion border and good cosmetic results  No other signs of trauma on exam     Return precautions discussed  Amount and/or Complexity of Data Reviewed  Tests in the radiology section of CPT®: ordered and reviewed    Patient Progress  Patient progress: stable            Disposition  [facility] Transfer: No  Final diagnoses:   Fall, initial encounter   Lip laceration, initial encounter   Chipped tooth     Time reflects when diagnosis was documented in both MDM as applicable and the Disposition within this note     Time User Action Codes Description Comment    8/25/2021 12:58 PM Feliciano Power Add Homero Wardo  UXQU] Fall, initial encounter     8/25/2021 12:58 PM Feliciano Power Add [G14 299R] Lip laceration, initial encounter     8/25/2021 12:58 PM Feliciano Power Add [S02  5XXA] Chipped tooth       ED Disposition     ED Disposition Condition Date/Time Comment    Discharge Stable Wed Aug 25, 2021 12:58 PM Dilermano F Anjos discharge to home/self care  Follow-up Information     Follow up With Specialties Details Why Contact Info Additional [address] [facility] [place]   Please call to make a follow-up appointment for further evaluation of your loose teeth   [address]  [phone]     [address]  Please call to schedule an appointment with a primary care doctor  Please follow-up with your doctor for further evaluation of the small calcification to the right side of your brain  This may include an MRI or a referral to a neurologist  [address] [address]  [address]  , [place], [place], [address] Emergency Department Emergency Medicine  As we discussed, return to the Emergency Department immediately for severe headache, redness or swelling to your laceration, or for new or concerning symptoms  [address] Emergency Department, [address], [place], [place], [address]        There are no discharge medications for this patient  No discharge procedures on file      PDMP Review     None          ED Provider  Electronically Signed by         Stone Hernandez MD  08/25/21 5161

## 2023-01-24 NOTE — ANESTHESIA POSTPROCEDURE EVALUATION
Labs show no significant abnormalities.  Follow-up as needed. Patient: Dilermano Anjos    Procedure Summary     Date:  08/02/20 Room / Location:  [facility]    Anesthesia Start:  0953 Anesthesia Stop:  1115    Procedure:  CYSTO, RETRO, STENT, URETEROSCOPY, LASER LITHOTRIPSY (Right Ureter) Diagnosis:       Nephrolithiasis      (Nephrolithiasis [N20.0])    Surgeon:  Moisés Rodriguez MD Responsible Provider:  Harman Lara MD    Anesthesia Type:  general ASA Status:  3          Anesthesia Type: general  PACU Vitals     No data found in the last 10 encounters.            Anesthesia Post Evaluation    Pain management: adequate  Patient location during evaluation: PACU  Patient participation: complete - patient participated  Level of consciousness: awake and alert  Cardiovascular status: acceptable  Airway Patency: adequate  Respiratory status: acceptable  Hydration status: acceptable  Anesthetic complications: no

## 2024-08-07 NOTE — ASSESSMENT & PLAN NOTE
Assessment & Plan     Secondary multiple arthritis  On bilateral knee/hands and neck with worsening stiffness, will have him to check on lab to rule out inflammatory arthritis, encouraged him to work with PT and trial of glucosamine, encouraged him to work on isometric exercise  - ESR: Erythrocyte sedimentation rate; Future  - CRP, inflammation; Future  - Rheumatoid factor; Future  - Vitamin D Deficiency; Future    Vitamin D deficiency    - Vitamin D Deficiency; Future    Neck muscle spasm  Mentioned above   - Physical Therapy  Referral; Future                Subjective   Chris is a 75 year old, presenting for the following health issues:  Knee Pain (Right knee pain, trouble walking, worsening, had a injury years ago. ) and Hand/wrist Stiffness (Bilateral stiffness and cramping on hands, since June, no treatment tried.)        8/7/2024     1:48 PM   Additional Questions   Roomed by Swapna OWENS     Knee Pain    History of Present Illness       Reason for visit:  Cramping in hands & occasionally feet. Pressure in head from top to back with feeling of congestion & intermittent headaches.  Symptom onset:  More than a month  Symptoms include:  Cramping in fingers & feet. Pressure in head. Congestion feeling. Headaches.  Symptom intensity:  Moderate  Symptom progression:  Staying the same  Had these symptoms before:  No  What makes it worse:  Not sure.  What makes it better:  Tylenol for headaches. Sinus congestion medication. Opening/closing fingers.    He eats 4 or more servings of fruits and vegetables daily.He consumes 1 sweetened beverage(s) daily.He exercises with enough effort to increase his heart rate 60 or more minutes per day.  He exercises with enough effort to increase his heart rate 7 days per week.   He is taking medications regularly.         Pain History:  When did you first notice your pain? Chronic   Have you seen this provider for your pain in the past? No   Where in your body do your have pain?  S/p laser lithotripsy and ureteral stent   Right knee  Are you seeing anyone else for your pain? No  What makes your pain better? Wrapped support  What makes your pain worse? With activity  How has pain affected your ability to work? Not currently working - unrelated to pain  Who lives in your household?                         Review of Systems  Constitutional, HEENT, cardiovascular, pulmonary, GI, , musculoskeletal, neuro, skin, endocrine and psych systems are negative, except as otherwise noted.      Objective    /87   Pulse 68   Temp 98.2  F (36.8  C) (Temporal)   Resp 12   Wt 103.6 kg (228 lb 8 oz)   SpO2 98%   BMI 31.87 kg/m    Body mass index is 31.87 kg/m .  Physical Exam   GENERAL: alert and no distress  EYES: Eyes grossly normal to inspection, PERRL and conjunctivae and sclerae normal  HENT: ear canals and TM's normal, nose and mouth without ulcers or lesions  NECK: no adenopathy, no asymmetry, masses, or scars  RESP: lungs clear to auscultation - no rales, rhonchi or wheezes  CV: regular rate and rhythm, normal S1 S2, no S3 or S4, no murmur, click or rub, no peripheral edema  ABDOMEN: soft, nontender, no hepatosplenomegaly, no masses and bowel sounds normal  MS: no gross musculoskeletal defects noted, no edema  SKIN: no suspicious lesions or rashes  NEURO: Normal strength and tone, mentation intact and speech normal            Signed Electronically by: Hipolito Alva MD

## (undated) DEVICE — Device

## (undated) DEVICE — CHECK-FLOW ADAPTOR

## (undated) DEVICE — WIRE GUIDE SENSOR DUAL FLEX .038

## (undated) DEVICE — HEEL  ELBOW PROTECTOR

## (undated) DEVICE — SOLN IRRIG STER WATER 500ML

## (undated) DEVICE — TRAY WET SKIN PREP PREMIUM

## (undated) DEVICE — SYRINGE 10CC NO NEEDLE ST

## (undated) DEVICE — DRAINBAG URO TABLE W/12FT HOSE NON-STERILE

## (undated) DEVICE — PACK RFID CYSTOSCOPY

## (undated) DEVICE — COVER MAYO STAND

## (undated) DEVICE — ADAPTER URETHERAL CATH

## (undated) DEVICE — BASKET STONE RETRIEVAL ZERO TIP 2.4 FR

## (undated) DEVICE — SOLN IRRIG .9%SOD 500ML

## (undated) DEVICE — CATH OPEN ENDED 6FR

## (undated) DEVICE — ***USE 57698*** SLEEVE FLOWTRON DVT CALF SINGLE USE

## (undated) DEVICE — GOWN SURGICAL REINFORCED X-LAR

## (undated) DEVICE — ADAPTOR ADJUSTABLE BIOPSY PORT SEAL

## (undated) DEVICE — GLOVE SZ 7 PROTEXIS PI

## (undated) DEVICE — BULB PATHFINDER PLUS

## (undated) DEVICE — TUBING PRESSURE LUERLOK COBE 48

## (undated) DEVICE — CONTAINER SPECIMEN STERILE 5OZ

## (undated) DEVICE — SOLN IRRIG .9%SOD 3L

## (undated) DEVICE — SET IRRIGATION LEVEL I CYSTO